# Patient Record
Sex: FEMALE | Race: WHITE | Employment: FULL TIME | URBAN - METROPOLITAN AREA
[De-identification: names, ages, dates, MRNs, and addresses within clinical notes are randomized per-mention and may not be internally consistent; named-entity substitution may affect disease eponyms.]

---

## 2022-12-22 ENCOUNTER — OFFICE VISIT (OUTPATIENT)
Dept: INTERNAL MEDICINE CLINIC | Facility: CLINIC | Age: 57
End: 2022-12-22
Payer: COMMERCIAL

## 2022-12-22 VITALS
TEMPERATURE: 98 F | HEIGHT: 62 IN | HEART RATE: 56 BPM | RESPIRATION RATE: 16 BRPM | BODY MASS INDEX: 41.22 KG/M2 | SYSTOLIC BLOOD PRESSURE: 112 MMHG | OXYGEN SATURATION: 97 % | DIASTOLIC BLOOD PRESSURE: 88 MMHG | WEIGHT: 224 LBS

## 2022-12-22 DIAGNOSIS — M25.511 RIGHT ANTERIOR SHOULDER PAIN: ICD-10-CM

## 2022-12-22 DIAGNOSIS — Z23 NEED FOR INFLUENZA VACCINATION: ICD-10-CM

## 2022-12-22 DIAGNOSIS — E03.9 HYPOTHYROIDISM, ADULT: ICD-10-CM

## 2022-12-22 DIAGNOSIS — I10 PRIMARY HYPERTENSION: Primary | ICD-10-CM

## 2022-12-22 DIAGNOSIS — G47.33 OSA (OBSTRUCTIVE SLEEP APNEA): ICD-10-CM

## 2022-12-22 DIAGNOSIS — K21.9 GASTROESOPHAGEAL REFLUX DISEASE, UNSPECIFIED WHETHER ESOPHAGITIS PRESENT: ICD-10-CM

## 2022-12-22 DIAGNOSIS — Z85.850 HISTORY OF THYROID CANCER: ICD-10-CM

## 2022-12-22 DIAGNOSIS — L40.0 PLAQUE PSORIASIS: ICD-10-CM

## 2022-12-22 DIAGNOSIS — E55.9 VITAMIN D DEFICIENCY: ICD-10-CM

## 2022-12-22 PROBLEM — H44.20 DEGENERATIVE PROGRESSIVE HIGH MYOPIA: Status: ACTIVE | Noted: 2022-12-22

## 2022-12-22 PROBLEM — H16.149 PUNCTATE KERATITIS: Status: ACTIVE | Noted: 2022-12-22

## 2022-12-22 PROBLEM — H43.819 VITREOUS DEGENERATION: Status: ACTIVE | Noted: 2022-12-22

## 2022-12-22 PROBLEM — D68.51 FACTOR 5 LEIDEN MUTATION, HETEROZYGOUS (HCC): Status: ACTIVE | Noted: 2022-12-22

## 2022-12-22 PROBLEM — H26.493 BILATERAL POSTERIOR CAPSULAR OPACIFICATION: Status: ACTIVE | Noted: 2022-12-22

## 2022-12-22 PROCEDURE — 3008F BODY MASS INDEX DOCD: CPT | Performed by: NURSE PRACTITIONER

## 2022-12-22 PROCEDURE — 90686 IIV4 VACC NO PRSV 0.5 ML IM: CPT | Performed by: NURSE PRACTITIONER

## 2022-12-22 PROCEDURE — 3074F SYST BP LT 130 MM HG: CPT | Performed by: NURSE PRACTITIONER

## 2022-12-22 PROCEDURE — 3079F DIAST BP 80-89 MM HG: CPT | Performed by: NURSE PRACTITIONER

## 2022-12-22 PROCEDURE — 90471 IMMUNIZATION ADMIN: CPT | Performed by: NURSE PRACTITIONER

## 2022-12-22 PROCEDURE — 99205 OFFICE O/P NEW HI 60 MIN: CPT | Performed by: NURSE PRACTITIONER

## 2022-12-22 RX ORDER — AMLODIPINE BESYLATE 10 MG/1
10 TABLET ORAL DAILY
Qty: 90 TABLET | Refills: 1 | Status: SHIPPED | OUTPATIENT
Start: 2022-12-22

## 2022-12-22 RX ORDER — OMEPRAZOLE 20 MG/1
20 CAPSULE, DELAYED RELEASE ORAL
Qty: 90 CAPSULE | Refills: 1 | Status: SHIPPED | OUTPATIENT
Start: 2022-12-22

## 2022-12-22 RX ORDER — LEVOTHYROXINE SODIUM 0.05 MG/1
50 TABLET ORAL
COMMUNITY

## 2022-12-22 RX ORDER — AMLODIPINE BESYLATE 10 MG/1
10 TABLET ORAL DAILY
COMMUNITY
End: 2022-12-22

## 2022-12-22 RX ORDER — PROPRANOLOL HYDROCHLORIDE 10 MG/1
10 TABLET ORAL 2 TIMES DAILY
COMMUNITY
End: 2022-12-22

## 2022-12-22 RX ORDER — PROPRANOLOL HYDROCHLORIDE 10 MG/1
10 TABLET ORAL 2 TIMES DAILY
Qty: 180 TABLET | Refills: 1 | Status: SHIPPED | OUTPATIENT
Start: 2022-12-22

## 2023-01-06 ENCOUNTER — TELEPHONE (OUTPATIENT)
Dept: ENDOCRINOLOGY CLINIC | Facility: CLINIC | Age: 58
End: 2023-01-06

## 2023-01-06 NOTE — TELEPHONE ENCOUNTER
Recd via fax 5/5/22 and 9/14/22 LOV notes from Dr. Josue Ramirez office for patient per our request.     Also recd faxed sx discharge notes from 4/21/22 endocrine sx. Placed in reception folder.

## 2023-01-24 ENCOUNTER — PATIENT MESSAGE (OUTPATIENT)
Facility: CLINIC | Age: 58
End: 2023-01-24

## 2023-01-24 DIAGNOSIS — Z85.850 HISTORY OF THYROID CANCER: Primary | ICD-10-CM

## 2023-01-24 DIAGNOSIS — E03.9 HYPOTHYROIDISM, ADULT: ICD-10-CM

## 2023-01-25 NOTE — TELEPHONE ENCOUNTER
Per Dr. Angel walsh to order labs: TSH, free T4, BMP, and PTH. Orders generated through [facility] response sent to patient- requesting fax # of [phone] location she will be going to. Will await reply.

## 2023-01-25 NOTE — TELEPHONE ENCOUNTER
From: Lavon Dempsey  To: Alyse Tucker MD  Sent: 1/24/2023 8:31 PM CST  Subject: TSH w/ Reflex FT4 Lab Order Prior to Office Visit    Dr Coni Ramos - I am scheduled for a new patient in-office visit with you next week. I have an open order with [facility] for a TSH w/ Reflex FT4 blood test. However, I am currently in [place] so will not be able to get my blood test completed at [facility] this week.  Could you send the TSH w/ Reflex FT4 order into [employer] so that I can have the blood test performed this week in [place] at [place]?    Thank you in advance,  Mirna Almonte  [phone]

## 2023-01-25 NOTE — TELEPHONE ENCOUNTER
Faxed patient's lab orders to the [address] [place] [address] location she provided: [phone]. Fax confirmation received. Will close this encounter.

## 2023-01-30 ENCOUNTER — TELEPHONE (OUTPATIENT)
Dept: ENDOCRINOLOGY CLINIC | Facility: CLINIC | Age: 58
End: 2023-01-30

## 2023-01-30 NOTE — TELEPHONE ENCOUNTER
1/30/23-Received via fax from [facility] the following lab results collected on 2/31/66: Basic Metabolic Panel, PTH, Intact, Free T4 and TSH. Given to MD to review.

## 2023-02-01 ENCOUNTER — OFFICE VISIT (OUTPATIENT)
Facility: CLINIC | Age: 58
End: 2023-02-01
Payer: COMMERCIAL

## 2023-02-01 VITALS
DIASTOLIC BLOOD PRESSURE: 88 MMHG | OXYGEN SATURATION: 99 % | SYSTOLIC BLOOD PRESSURE: 138 MMHG | WEIGHT: 217.63 LBS | HEART RATE: 63 BPM | BODY MASS INDEX: 40 KG/M2

## 2023-02-01 DIAGNOSIS — E05.00 GRAVES' DISEASE IN REMISSION: ICD-10-CM

## 2023-02-01 DIAGNOSIS — E89.2 STATUS POST SUBTOTAL PARATHYROIDECTOMY (HCC): ICD-10-CM

## 2023-02-01 DIAGNOSIS — Z85.850 HISTORY OF THYROID CANCER: Primary | ICD-10-CM

## 2023-02-01 PROCEDURE — 99417 PROLNG OP E/M EACH 15 MIN: CPT | Performed by: STUDENT IN AN ORGANIZED HEALTH CARE EDUCATION/TRAINING PROGRAM

## 2023-02-01 PROCEDURE — 3075F SYST BP GE 130 - 139MM HG: CPT | Performed by: STUDENT IN AN ORGANIZED HEALTH CARE EDUCATION/TRAINING PROGRAM

## 2023-02-01 PROCEDURE — 99205 OFFICE O/P NEW HI 60 MIN: CPT | Performed by: STUDENT IN AN ORGANIZED HEALTH CARE EDUCATION/TRAINING PROGRAM

## 2023-02-01 PROCEDURE — 3079F DIAST BP 80-89 MM HG: CPT | Performed by: STUDENT IN AN ORGANIZED HEALTH CARE EDUCATION/TRAINING PROGRAM

## 2023-02-01 RX ORDER — METHYLCELLULOSE 500 MG/1
TABLET ORAL
COMMUNITY

## 2023-02-08 ENCOUNTER — TELEPHONE (OUTPATIENT)
Dept: ORTHOPEDICS CLINIC | Facility: CLINIC | Age: 58
End: 2023-02-08

## 2023-02-08 DIAGNOSIS — M25.511 BILATERAL SHOULDER PAIN, UNSPECIFIED CHRONICITY: Primary | ICD-10-CM

## 2023-02-08 DIAGNOSIS — M25.512 BILATERAL SHOULDER PAIN, UNSPECIFIED CHRONICITY: Primary | ICD-10-CM

## 2023-02-08 NOTE — TELEPHONE ENCOUNTER
Please enter an Xray RX for a RODGER Shoulders/Upper Arm for  this patient's upcoming appointment, as the patient has not had any imaging completed. Patient was instructed to get X-rays before appt at appx 10:30am.  PLEASE REPLY TO THIS MESSAGE when the order is put in EPIC so that I can schedule the Xray appt. Thank you, in advance!       Future Appointments   Date Time Provider Mounika Kelsey   2/13/2023 11:10 AM Jessica Mason MD [facility] [id number]

## 2023-02-08 NOTE — TELEPHONE ENCOUNTER
Reviewed patients chart, xray orders are required. Order placed for bilateral shoulder xrays.  Please contact patient advise to arrive 30 mins prior to patients appt to complete x-ray order and schedule patients xray appt-Thank you

## 2023-02-13 ENCOUNTER — OFFICE VISIT (OUTPATIENT)
Dept: ORTHOPEDICS CLINIC | Facility: CLINIC | Age: 58
End: 2023-02-13
Payer: COMMERCIAL

## 2023-02-13 ENCOUNTER — HOSPITAL ENCOUNTER (OUTPATIENT)
Dept: GENERAL RADIOLOGY | Age: 58
Discharge: HOME | End: 2023-02-13
Attending: ORTHOPAEDIC SURGERY
Payer: COMMERCIAL

## 2023-02-13 VITALS — BODY MASS INDEX: 39.93 KG/M2 | WEIGHT: 217 LBS | HEIGHT: 62 IN

## 2023-02-13 DIAGNOSIS — M75.01 ADHESIVE CAPSULITIS OF RIGHT SHOULDER: Primary | ICD-10-CM

## 2023-02-13 DIAGNOSIS — M25.512 BILATERAL SHOULDER PAIN, UNSPECIFIED CHRONICITY: ICD-10-CM

## 2023-02-13 DIAGNOSIS — M25.511 BILATERAL SHOULDER PAIN, UNSPECIFIED CHRONICITY: ICD-10-CM

## 2023-02-13 PROCEDURE — 3008F BODY MASS INDEX DOCD: CPT | Performed by: ORTHOPAEDIC SURGERY

## 2023-02-13 PROCEDURE — 99204 OFFICE O/P NEW MOD 45 MIN: CPT | Performed by: ORTHOPAEDIC SURGERY

## 2023-02-13 PROCEDURE — 73030 X-RAY EXAM OF SHOULDER: CPT | Performed by: ORTHOPAEDIC SURGERY

## 2023-02-13 PROCEDURE — 20610 DRAIN/INJ JOINT/BURSA W/O US: CPT | Performed by: ORTHOPAEDIC SURGERY

## 2023-02-13 RX ORDER — TRIAMCINOLONE ACETONIDE 40 MG/ML
40 INJECTION, SUSPENSION INTRA-ARTICULAR; INTRAMUSCULAR ONCE
Status: COMPLETED | OUTPATIENT
Start: 2023-02-13 | End: 2023-02-13

## 2023-02-13 RX ORDER — KETOROLAC TROMETHAMINE 30 MG/ML
30 INJECTION, SOLUTION INTRAMUSCULAR; INTRAVENOUS ONCE
Status: COMPLETED | OUTPATIENT
Start: 2023-02-13 | End: 2023-02-13

## 2023-02-13 RX ADMIN — KETOROLAC TROMETHAMINE 30 MG: 30 INJECTION, SOLUTION INTRAMUSCULAR; INTRAVENOUS at 11:10:00

## 2023-02-13 RX ADMIN — TRIAMCINOLONE ACETONIDE 40 MG: 40 INJECTION, SUSPENSION INTRA-ARTICULAR; INTRAMUSCULAR at 11:10:00

## 2023-02-13 NOTE — PROCEDURES
Right Shoulder Glenohumeral Joint Injection    Name: Rox Camarena   MRN: [id number]  Date: 2/13/2023     Clinical Indications:   Adhesive Capsulitis. After informed consent, the injection site was marked, sterilized with topical chlorhexidine antiseptic, and locally anesthetized with skin refrigerant. The patient was seated upright and the shoulder was exposed. Using sterile technique: 1 mL of 30mg/mL of Ketorolac, 2 mL of 0.5% Bupivicaine, 2 mL of 1% Lidocaine, and 1 mg of 40mg/mL of Triamcinolone (Kenalog) was injected with a Anterior approach utilizing a 21 gauge needle. A band-aid was applied. The patient tolerated the procedure well. Disposition:   Proceed with physical therapy and return for repeat evaluation in 6 weeks. No imaging required at next visit. Karen Soto. King Santillan MD  Knee, Shoulder, & Elbow Surgery / Sports Medicine Specialist  [facility]  [address], [address] [place] [address]. [email or web address]  t: [phone]  o: [phone]  f: [phone]

## 2023-02-23 ENCOUNTER — HOSPITAL ENCOUNTER (OUTPATIENT)
Dept: ULTRASOUND IMAGING | Age: 58
Discharge: HOME | End: 2023-02-23
Attending: STUDENT IN AN ORGANIZED HEALTH CARE EDUCATION/TRAINING PROGRAM
Payer: COMMERCIAL

## 2023-02-23 DIAGNOSIS — Z85.850 HISTORY OF THYROID CANCER: ICD-10-CM

## 2023-02-23 PROCEDURE — 76536 US EXAM OF HEAD AND NECK: CPT | Performed by: STUDENT IN AN ORGANIZED HEALTH CARE EDUCATION/TRAINING PROGRAM

## 2023-03-23 ENCOUNTER — OFFICE VISIT (OUTPATIENT)
Dept: INTERNAL MEDICINE CLINIC | Facility: CLINIC | Age: 58
End: 2023-03-23
Payer: COMMERCIAL

## 2023-03-23 ENCOUNTER — LAB ENCOUNTER (OUTPATIENT)
Dept: LAB | Age: 58
End: 2023-03-23
Attending: INTERNAL MEDICINE
Payer: COMMERCIAL

## 2023-03-23 VITALS
SYSTOLIC BLOOD PRESSURE: 128 MMHG | HEIGHT: 62 IN | RESPIRATION RATE: 16 BRPM | HEART RATE: 64 BPM | OXYGEN SATURATION: 98 % | TEMPERATURE: 99 F | WEIGHT: 214 LBS | DIASTOLIC BLOOD PRESSURE: 72 MMHG | BODY MASS INDEX: 39.38 KG/M2

## 2023-03-23 DIAGNOSIS — T14.8XXA BRUISING: Primary | ICD-10-CM

## 2023-03-23 DIAGNOSIS — Z12.31 ENCOUNTER FOR SCREENING MAMMOGRAM FOR MALIGNANT NEOPLASM OF BREAST: ICD-10-CM

## 2023-03-23 DIAGNOSIS — T14.8XXA BRUISING: ICD-10-CM

## 2023-03-23 DIAGNOSIS — I10 PRIMARY HYPERTENSION: ICD-10-CM

## 2023-03-23 DIAGNOSIS — R35.0 URINE FREQUENCY: ICD-10-CM

## 2023-03-23 DIAGNOSIS — Z12.11 COLON CANCER SCREENING: ICD-10-CM

## 2023-03-23 PROBLEM — E55.9 VITAMIN D DEFICIENCY: Status: RESOLVED | Noted: 2022-12-22 | Resolved: 2023-03-23

## 2023-03-23 LAB
BASOPHILS # BLD AUTO: 0.03 X10(3) UL (ref 0–0.2)
BASOPHILS NFR BLD AUTO: 0.4 %
EOSINOPHIL # BLD AUTO: 0.07 X10(3) UL (ref 0–0.7)
EOSINOPHIL NFR BLD AUTO: 1 %
ERYTHROCYTE [DISTWIDTH] IN BLOOD BY AUTOMATED COUNT: 13.6 %
HCT VFR BLD AUTO: 45 %
HGB BLD-MCNC: 14.8 G/DL
IMM GRANULOCYTES # BLD AUTO: 0.02 X10(3) UL (ref 0–1)
IMM GRANULOCYTES NFR BLD: 0.3 %
LYMPHOCYTES # BLD AUTO: 1.39 X10(3) UL (ref 1–4)
LYMPHOCYTES NFR BLD AUTO: 20 %
MCH RBC QN AUTO: 29.2 PG (ref 26–34)
MCHC RBC AUTO-ENTMCNC: 32.9 G/DL (ref 31–37)
MCV RBC AUTO: 88.9 FL
MONOCYTES # BLD AUTO: 0.59 X10(3) UL (ref 0.1–1)
MONOCYTES NFR BLD AUTO: 8.5 %
NEUTROPHILS # BLD AUTO: 4.86 X10 (3) UL (ref 1.5–7.7)
NEUTROPHILS # BLD AUTO: 4.86 X10(3) UL (ref 1.5–7.7)
NEUTROPHILS NFR BLD AUTO: 69.8 %
PLATELET # BLD AUTO: 340 10(3)UL (ref 150–450)
RBC # BLD AUTO: 5.06 X10(6)UL
WBC # BLD AUTO: 7 X10(3) UL (ref 4–11)

## 2023-03-23 PROCEDURE — 99214 OFFICE O/P EST MOD 30 MIN: CPT | Performed by: INTERNAL MEDICINE

## 2023-03-23 PROCEDURE — 85025 COMPLETE CBC W/AUTO DIFF WBC: CPT

## 2023-03-23 PROCEDURE — 3078F DIAST BP <80 MM HG: CPT | Performed by: INTERNAL MEDICINE

## 2023-03-23 PROCEDURE — 3008F BODY MASS INDEX DOCD: CPT | Performed by: INTERNAL MEDICINE

## 2023-03-23 PROCEDURE — 36415 COLL VENOUS BLD VENIPUNCTURE: CPT

## 2023-03-23 PROCEDURE — 3074F SYST BP LT 130 MM HG: CPT | Performed by: INTERNAL MEDICINE

## 2023-04-01 ENCOUNTER — MED REC SCAN ONLY (OUTPATIENT)
Dept: ORTHOPEDICS CLINIC | Facility: CLINIC | Age: 58
End: 2023-04-01

## 2023-04-21 ENCOUNTER — TELEPHONE (OUTPATIENT)
Dept: INTERNAL MEDICINE CLINIC | Facility: CLINIC | Age: 58
End: 2023-04-21

## 2023-04-21 DIAGNOSIS — T78.40XA ALLERGIC REACTION, INITIAL ENCOUNTER: Primary | ICD-10-CM

## 2023-04-21 RX ORDER — TERBINAFINE HYDROCHLORIDE 250 MG/1
250 TABLET ORAL DAILY
COMMUNITY
Start: 2023-03-26

## 2023-04-21 NOTE — TELEPHONE ENCOUNTER
Pt went to the ER for an extreme allergic reaction. She has some questions about the medications that she was put on. She is currently out of state in [place]. Please advise.

## 2023-04-21 NOTE — TELEPHONE ENCOUNTER
Spoke with pt she is in [address] [facility] and yesterday woke up to several type of bites on her chin, under her chin and elbows. They were red, swollen and itching. She applied neosporin and as the day progressed the bites increased in size, she began having swelling in her entire face, lips and inside of her mouth. He face was also flushed. She went to ER and was given IV benadryl, pepcid, steroid x 1. She was released. Her symptoms are improving. She was given the following medications to fill @ pharmacy and is inquiring if she really needs to take them. Famotidine 20mg bid   Hydroxyzine 50mg QID prn itching  Medrol dose pack     She is concerned with taking the medrol dose pack as she is currently being treated for a toe nail fungus with terbinafine and ready somewhere the steroid can make the infection worse. Per Dr. Gilson Haas if symptoms continue to improve ok to not fill medication but if they continue or do not improve begin medications. D/w pt above recommendations. She expressed understanding. She is seeing more red areas on different parts of her body now and will fill medications and begin. She would like allergy referral recommendations sent via [address] [place] [address] [address].

## 2023-05-09 PROBLEM — Z86.010 HISTORY OF ADENOMATOUS POLYP OF COLON: Status: ACTIVE | Noted: 2023-05-09

## 2023-05-09 PROBLEM — Z86.0101 HISTORY OF ADENOMATOUS POLYP OF COLON: Status: ACTIVE | Noted: 2023-05-09

## 2023-05-23 ENCOUNTER — OFFICE VISIT (OUTPATIENT)
Dept: INTERNAL MEDICINE CLINIC | Facility: CLINIC | Age: 58
End: 2023-05-23
Payer: COMMERCIAL

## 2023-05-23 VITALS
DIASTOLIC BLOOD PRESSURE: 78 MMHG | WEIGHT: 215 LBS | BODY MASS INDEX: 39.56 KG/M2 | RESPIRATION RATE: 16 BRPM | HEIGHT: 62 IN | HEART RATE: 78 BPM | SYSTOLIC BLOOD PRESSURE: 122 MMHG

## 2023-05-23 DIAGNOSIS — Z51.81 THERAPEUTIC DRUG MONITORING: Primary | ICD-10-CM

## 2023-05-23 DIAGNOSIS — E66.9 CLASS II OBESITY: ICD-10-CM

## 2023-05-23 DIAGNOSIS — R73.03 PREDIABETES: ICD-10-CM

## 2023-05-23 DIAGNOSIS — G47.33 OSA (OBSTRUCTIVE SLEEP APNEA): ICD-10-CM

## 2023-05-23 DIAGNOSIS — E03.9 HYPOTHYROIDISM, ADULT: ICD-10-CM

## 2023-05-23 DIAGNOSIS — I10 PRIMARY HYPERTENSION: ICD-10-CM

## 2023-05-23 PROBLEM — E66.01 MORBID (SEVERE) OBESITY DUE TO EXCESS CALORIES (HCC): Status: ACTIVE | Noted: 2023-05-23

## 2023-05-23 PROCEDURE — 99204 OFFICE O/P NEW MOD 45 MIN: CPT | Performed by: INTERNAL MEDICINE

## 2023-05-23 PROCEDURE — 3078F DIAST BP <80 MM HG: CPT | Performed by: INTERNAL MEDICINE

## 2023-05-23 PROCEDURE — 3008F BODY MASS INDEX DOCD: CPT | Performed by: INTERNAL MEDICINE

## 2023-05-23 PROCEDURE — 3074F SYST BP LT 130 MM HG: CPT | Performed by: INTERNAL MEDICINE

## 2023-05-23 RX ORDER — EPINEPHRINE 0.3 MG/.3ML
1 INJECTION SUBCUTANEOUS DAILY
COMMUNITY
Start: 2023-04-21

## 2023-05-23 RX ORDER — ITRACONAZOLE 100 MG/1
CAPSULE ORAL
COMMUNITY
Start: 2023-05-02

## 2023-05-30 ENCOUNTER — PATIENT MESSAGE (OUTPATIENT)
Dept: INTERNAL MEDICINE CLINIC | Facility: CLINIC | Age: 58
End: 2023-05-30

## 2023-05-30 DIAGNOSIS — Z00.00 ENCOUNTER FOR ANNUAL HEALTH EXAMINATION: Primary | ICD-10-CM

## 2023-05-30 NOTE — TELEPHONE ENCOUNTER
From: Rudi Morris  To: Yobani Dumont MD  Sent: 5/30/2023 9:42 AM CDT  Subject: Blood tests ahead of June 5th's physical exam    Dr Nacho Sanabria indicated at my last appointment that I should reach out in advance of my physical on June 5 to request orders for blood tests to be performed before the physical. Please submit the necessary blood test lab orders, and I will go before the end of this week to an [employer]. Please include liver tests as I was taking terbinafine recently. Note that I am under the care of an endocrinologist for my thyroid and am scheduled to have TSH, PTH, T3, T4 blood tests on July 26 for my next endocrine appointment, so do not need any thyroid tests right now (my last thyroid blood tests in January were normal).     Thank you,  Franky Lees   [phone]

## 2023-06-01 ENCOUNTER — LAB ENCOUNTER (OUTPATIENT)
Dept: LAB | Age: 58
End: 2023-06-01
Attending: INTERNAL MEDICINE
Payer: COMMERCIAL

## 2023-06-01 DIAGNOSIS — G47.33 OSA (OBSTRUCTIVE SLEEP APNEA): ICD-10-CM

## 2023-06-01 DIAGNOSIS — E03.9 HYPOTHYROIDISM, ADULT: ICD-10-CM

## 2023-06-01 DIAGNOSIS — I10 PRIMARY HYPERTENSION: ICD-10-CM

## 2023-06-01 DIAGNOSIS — E66.9 CLASS II OBESITY: ICD-10-CM

## 2023-06-01 DIAGNOSIS — Z51.81 THERAPEUTIC DRUG MONITORING: ICD-10-CM

## 2023-06-01 DIAGNOSIS — Z00.00 ENCOUNTER FOR ANNUAL HEALTH EXAMINATION: ICD-10-CM

## 2023-06-01 LAB
ALBUMIN SERPL-MCNC: 4.2 G/DL (ref 3.4–5)
ALBUMIN/GLOB SERPL: 1.1 {RATIO} (ref 1–2)
ALP LIVER SERPL-CCNC: 26 U/L
ALT SERPL-CCNC: 22 U/L
ANION GAP SERPL CALC-SCNC: 9 MMOL/L (ref 0–18)
AST SERPL-CCNC: 19 U/L (ref 15–37)
BASOPHILS # BLD AUTO: 0.02 X10(3) UL (ref 0–0.2)
BASOPHILS NFR BLD AUTO: 0.3 %
BILIRUB SERPL-MCNC: 0.6 MG/DL (ref 0.1–2)
BILIRUB UR QL STRIP.AUTO: NEGATIVE
BUN BLD-MCNC: 27 MG/DL (ref 7–18)
CALCIUM BLD-MCNC: 9.6 MG/DL (ref 8.5–10.1)
CHLORIDE SERPL-SCNC: 104 MMOL/L (ref 98–112)
CHOLEST SERPL-MCNC: 180 MG/DL (ref ?–200)
CLARITY UR REFRACT.AUTO: CLEAR
CO2 SERPL-SCNC: 23 MMOL/L (ref 21–32)
CREAT BLD-MCNC: 1.07 MG/DL
EOSINOPHIL # BLD AUTO: 0.03 X10(3) UL (ref 0–0.7)
EOSINOPHIL NFR BLD AUTO: 0.4 %
ERYTHROCYTE [DISTWIDTH] IN BLOOD BY AUTOMATED COUNT: 13.2 %
EST. AVERAGE GLUCOSE BLD GHB EST-MCNC: 117 MG/DL (ref 68–126)
FASTING PATIENT LIPID ANSWER: YES
FASTING STATUS PATIENT QL REPORTED: YES
FOLATE SERPL-MCNC: 6 NG/ML (ref 8.7–?)
GFR SERPLBLD BASED ON 1.73 SQ M-ARVRAT: 61 ML/MIN/1.73M2 (ref 60–?)
GLOBULIN PLAS-MCNC: 4 G/DL (ref 2.8–4.4)
GLUCOSE BLD-MCNC: 74 MG/DL (ref 70–99)
GLUCOSE UR STRIP.AUTO-MCNC: NEGATIVE MG/DL
HBA1C MFR BLD: 5.7 % (ref ?–5.7)
HCT VFR BLD AUTO: 43 %
HDLC SERPL-MCNC: 66 MG/DL (ref 40–59)
HGB BLD-MCNC: 14.2 G/DL
IMM GRANULOCYTES # BLD AUTO: 0.02 X10(3) UL (ref 0–1)
IMM GRANULOCYTES NFR BLD: 0.3 %
LDLC SERPL CALC-MCNC: 104 MG/DL (ref ?–100)
LEUKOCYTE ESTERASE UR QL STRIP.AUTO: NEGATIVE
LYMPHOCYTES # BLD AUTO: 1.24 X10(3) UL (ref 1–4)
LYMPHOCYTES NFR BLD AUTO: 17.9 %
MCH RBC QN AUTO: 29.2 PG (ref 26–34)
MCHC RBC AUTO-ENTMCNC: 33 G/DL (ref 31–37)
MCV RBC AUTO: 88.5 FL
MONOCYTES # BLD AUTO: 0.4 X10(3) UL (ref 0.1–1)
MONOCYTES NFR BLD AUTO: 5.8 %
NEUTROPHILS # BLD AUTO: 5.23 X10 (3) UL (ref 1.5–7.7)
NEUTROPHILS # BLD AUTO: 5.23 X10(3) UL (ref 1.5–7.7)
NEUTROPHILS NFR BLD AUTO: 75.3 %
NITRITE UR QL STRIP.AUTO: NEGATIVE
NONHDLC SERPL-MCNC: 114 MG/DL (ref ?–130)
OSMOLALITY SERPL CALC.SUM OF ELEC: 286 MOSM/KG (ref 275–295)
PH UR STRIP.AUTO: 5 [PH] (ref 5–8)
PLATELET # BLD AUTO: 309 10(3)UL (ref 150–450)
POTASSIUM SERPL-SCNC: 4.2 MMOL/L (ref 3.5–5.1)
PROT SERPL-MCNC: 8.2 G/DL (ref 6.4–8.2)
PROT UR STRIP.AUTO-MCNC: NEGATIVE MG/DL
RBC # BLD AUTO: 4.86 X10(6)UL
RBC UR QL AUTO: NEGATIVE
SODIUM SERPL-SCNC: 136 MMOL/L (ref 136–145)
SP GR UR STRIP.AUTO: 1.01 (ref 1–1.03)
TRIGL SERPL-MCNC: 53 MG/DL (ref 30–149)
UROBILINOGEN UR STRIP.AUTO-MCNC: <2 MG/DL
VIT B12 SERPL-MCNC: 469 PG/ML (ref 193–986)
VLDLC SERPL CALC-MCNC: 9 MG/DL (ref 0–30)
WBC # BLD AUTO: 6.9 X10(3) UL (ref 4–11)

## 2023-06-01 PROCEDURE — 82607 VITAMIN B-12: CPT | Performed by: INTERNAL MEDICINE

## 2023-06-01 PROCEDURE — 80053 COMPREHEN METABOLIC PANEL: CPT | Performed by: INTERNAL MEDICINE

## 2023-06-01 PROCEDURE — 83036 HEMOGLOBIN GLYCOSYLATED A1C: CPT | Performed by: INTERNAL MEDICINE

## 2023-06-01 PROCEDURE — 85025 COMPLETE CBC W/AUTO DIFF WBC: CPT | Performed by: INTERNAL MEDICINE

## 2023-06-01 PROCEDURE — 80061 LIPID PANEL: CPT | Performed by: INTERNAL MEDICINE

## 2023-06-01 PROCEDURE — 82746 ASSAY OF FOLIC ACID SERUM: CPT | Performed by: INTERNAL MEDICINE

## 2023-06-01 PROCEDURE — 81003 URINALYSIS AUTO W/O SCOPE: CPT | Performed by: INTERNAL MEDICINE

## 2023-06-05 ENCOUNTER — OFFICE VISIT (OUTPATIENT)
Dept: INTERNAL MEDICINE CLINIC | Facility: CLINIC | Age: 58
End: 2023-06-05
Payer: COMMERCIAL

## 2023-06-05 VITALS
DIASTOLIC BLOOD PRESSURE: 80 MMHG | RESPIRATION RATE: 16 BRPM | OXYGEN SATURATION: 98 % | TEMPERATURE: 98 F | HEART RATE: 64 BPM | HEIGHT: 62 IN | SYSTOLIC BLOOD PRESSURE: 128 MMHG | BODY MASS INDEX: 39.93 KG/M2 | WEIGHT: 217 LBS

## 2023-06-05 DIAGNOSIS — Z00.00 ANNUAL PHYSICAL EXAM: Primary | ICD-10-CM

## 2023-06-05 PROCEDURE — 99396 PREV VISIT EST AGE 40-64: CPT | Performed by: INTERNAL MEDICINE

## 2023-06-05 PROCEDURE — 3079F DIAST BP 80-89 MM HG: CPT | Performed by: INTERNAL MEDICINE

## 2023-06-05 PROCEDURE — 3074F SYST BP LT 130 MM HG: CPT | Performed by: INTERNAL MEDICINE

## 2023-06-05 PROCEDURE — 3008F BODY MASS INDEX DOCD: CPT | Performed by: INTERNAL MEDICINE

## 2023-06-07 ENCOUNTER — TELEPHONE (OUTPATIENT)
Dept: INTERNAL MEDICINE CLINIC | Facility: CLINIC | Age: 58
End: 2023-06-07

## 2023-06-07 NOTE — TELEPHONE ENCOUNTER
Ciclopirox 8 % External Solution    Medication not covered under insurance plan. Left detailed VM with patient. Notified of [employer] coupon option as well. Notified [facility] with any questions.

## 2023-06-15 ENCOUNTER — PATIENT MESSAGE (OUTPATIENT)
Facility: CLINIC | Age: 58
End: 2023-06-15

## 2023-06-15 DIAGNOSIS — E03.9 HYPOTHYROIDISM, ADULT: Primary | ICD-10-CM

## 2023-06-15 RX ORDER — LEVOTHYROXINE SODIUM 0.05 MG/1
50 TABLET ORAL
Qty: 90 TABLET | Refills: 0 | Status: SHIPPED | OUTPATIENT
Start: 2023-06-15 | End: 2023-09-13

## 2023-06-15 NOTE — TELEPHONE ENCOUNTER
LOV: 02/01/2023  Next OV: 08/02/2023  Per Dr Reza Mon notes labs due every 6 months. Labs currently ordered in system. Routed to Dr. Jeb Chavez for review.

## 2023-06-15 NOTE — TELEPHONE ENCOUNTER
From: Kamryn Meza  To: Jessa Portillo MD  Sent: 6/15/2023 1:16 PM CDT  Subject: Need prescription refill for levothyroxine    Dr Laurel Goldman - I have no more refills available for my levothyroxine (50 mcg) at my pharmacy. Could you please send a new prescription to my pharmacy on file ([facility] at [address] [place] [address])? Thanks!   Slim Barakat  [phone]

## 2023-06-26 DIAGNOSIS — I10 PRIMARY HYPERTENSION: ICD-10-CM

## 2023-06-26 RX ORDER — AMLODIPINE BESYLATE 10 MG/1
10 TABLET ORAL DAILY
Qty: 90 TABLET | Refills: 1 | Status: SHIPPED | OUTPATIENT
Start: 2023-06-26

## 2023-06-26 NOTE — TELEPHONE ENCOUNTER
Hypertension Medications Protocol [id number]/26/2023 11:22 AM   Protocol Details CMP or BMP in past 12 months    Last serum creatinine< 2.0    Appointment in past 6 or next 3 months

## 2023-07-26 ENCOUNTER — LAB ENCOUNTER (OUTPATIENT)
Dept: LAB | Age: 58
End: 2023-07-26
Attending: STUDENT IN AN ORGANIZED HEALTH CARE EDUCATION/TRAINING PROGRAM
Payer: COMMERCIAL

## 2023-07-26 DIAGNOSIS — Z85.850 HISTORY OF THYROID CANCER: ICD-10-CM

## 2023-07-26 DIAGNOSIS — E05.00 GRAVES' DISEASE IN REMISSION: ICD-10-CM

## 2023-07-26 DIAGNOSIS — E89.2 STATUS POST SUBTOTAL PARATHYROIDECTOMY (HCC): ICD-10-CM

## 2023-07-26 LAB
ANION GAP SERPL CALC-SCNC: 6 MMOL/L (ref 0–18)
BUN BLD-MCNC: 26 MG/DL (ref 7–18)
CALCIUM BLD-MCNC: 9.2 MG/DL (ref 8.5–10.1)
CHLORIDE SERPL-SCNC: 105 MMOL/L (ref 98–112)
CO2 SERPL-SCNC: 24 MMOL/L (ref 21–32)
CREAT BLD-MCNC: 1.11 MG/DL
EGFRCR SERPLBLD CKD-EPI 2021: 58 ML/MIN/1.73M2 (ref 60–?)
FASTING STATUS PATIENT QL REPORTED: YES
GLUCOSE BLD-MCNC: 91 MG/DL (ref 70–99)
OSMOLALITY SERPL CALC.SUM OF ELEC: 284 MOSM/KG (ref 275–295)
POTASSIUM SERPL-SCNC: 4.1 MMOL/L (ref 3.5–5.1)
PTH-INTACT SERPL-MCNC: 51.5 PG/ML (ref 18.5–88)
SODIUM SERPL-SCNC: 135 MMOL/L (ref 136–145)
T3 SERPL-MCNC: 63 NG/DL (ref 60–181)
T4 FREE SERPL-MCNC: 1.2 NG/DL (ref 0.8–1.7)
TSI SER-ACNC: 1.78 MIU/ML (ref 0.36–3.74)
VIT D+METAB SERPL-MCNC: 46.9 NG/ML (ref 30–100)

## 2023-07-26 PROCEDURE — 80048 BASIC METABOLIC PNL TOTAL CA: CPT | Performed by: STUDENT IN AN ORGANIZED HEALTH CARE EDUCATION/TRAINING PROGRAM

## 2023-07-26 PROCEDURE — 84439 ASSAY OF FREE THYROXINE: CPT | Performed by: STUDENT IN AN ORGANIZED HEALTH CARE EDUCATION/TRAINING PROGRAM

## 2023-07-26 PROCEDURE — 82306 VITAMIN D 25 HYDROXY: CPT | Performed by: STUDENT IN AN ORGANIZED HEALTH CARE EDUCATION/TRAINING PROGRAM

## 2023-07-26 PROCEDURE — 84480 ASSAY TRIIODOTHYRONINE (T3): CPT | Performed by: STUDENT IN AN ORGANIZED HEALTH CARE EDUCATION/TRAINING PROGRAM

## 2023-07-26 PROCEDURE — 83970 ASSAY OF PARATHORMONE: CPT | Performed by: STUDENT IN AN ORGANIZED HEALTH CARE EDUCATION/TRAINING PROGRAM

## 2023-07-26 PROCEDURE — 84443 ASSAY THYROID STIM HORMONE: CPT | Performed by: STUDENT IN AN ORGANIZED HEALTH CARE EDUCATION/TRAINING PROGRAM

## 2023-08-02 ENCOUNTER — OFFICE VISIT (OUTPATIENT)
Facility: CLINIC | Age: 58
End: 2023-08-02
Payer: COMMERCIAL

## 2023-08-02 VITALS — RESPIRATION RATE: 97 BRPM | SYSTOLIC BLOOD PRESSURE: 128 MMHG | DIASTOLIC BLOOD PRESSURE: 84 MMHG | HEART RATE: 76 BPM

## 2023-08-02 DIAGNOSIS — E89.2 STATUS POST SUBTOTAL PARATHYROIDECTOMY (HCC): ICD-10-CM

## 2023-08-02 DIAGNOSIS — E89.0 POST-SURGICAL HYPOTHYROIDISM: Primary | ICD-10-CM

## 2023-08-02 DIAGNOSIS — Z85.850 HISTORY OF THYROID CANCER: ICD-10-CM

## 2023-08-02 PROCEDURE — 99214 OFFICE O/P EST MOD 30 MIN: CPT | Performed by: STUDENT IN AN ORGANIZED HEALTH CARE EDUCATION/TRAINING PROGRAM

## 2023-08-02 PROCEDURE — 3079F DIAST BP 80-89 MM HG: CPT | Performed by: STUDENT IN AN ORGANIZED HEALTH CARE EDUCATION/TRAINING PROGRAM

## 2023-08-02 PROCEDURE — 3074F SYST BP LT 130 MM HG: CPT | Performed by: STUDENT IN AN ORGANIZED HEALTH CARE EDUCATION/TRAINING PROGRAM

## 2023-09-19 ENCOUNTER — LAB ENCOUNTER (OUTPATIENT)
Dept: LAB | Facility: HOSPITAL | Age: 58
End: 2023-09-19
Attending: INTERNAL MEDICINE
Payer: COMMERCIAL

## 2023-09-19 ENCOUNTER — PATIENT MESSAGE (OUTPATIENT)
Facility: CLINIC | Age: 58
End: 2023-09-19

## 2023-09-19 DIAGNOSIS — E89.0 POST-SURGICAL HYPOTHYROIDISM: Primary | ICD-10-CM

## 2023-09-19 DIAGNOSIS — K21.9 GASTROESOPHAGEAL REFLUX DISEASE, UNSPECIFIED WHETHER ESOPHAGITIS PRESENT: ICD-10-CM

## 2023-09-19 DIAGNOSIS — Z20.822 CLOSE EXPOSURE TO COVID-19 VIRUS: ICD-10-CM

## 2023-09-19 PROCEDURE — 87635 SARS-COV-2 COVID-19 AMP PRB: CPT

## 2023-09-19 RX ORDER — OMEPRAZOLE 20 MG/1
20 CAPSULE, DELAYED RELEASE ORAL
Qty: 90 CAPSULE | Refills: 0 | Status: SHIPPED | OUTPATIENT
Start: 2023-09-19

## 2023-09-19 NOTE — TELEPHONE ENCOUNTER
Last time medication was refilled 12/22/2022  Quantity and # of refills 90 w/ 1  Last OV 06/05/2023  Next OV 12/05/2023    Medication not on protocol.     Sent to [facility] for approval.

## 2023-09-19 NOTE — TELEPHONE ENCOUNTER
LOV: 8/02    RTC: 6 months     FU: 2/01    Last Refill:     Month Supply Pending: 3 months with 1 refill    Last lab results:  Component      Latest Ref Rng 7/26/2023   T4,Free (Direct)      0.8 - 1.7 ng/dL 1.2    TSH      0.358 - 3.740 mIU/mL 1.780    T3 TOTAL      60 - 181 ng/dL 63      LOV note: Currently on LT4 75mcg  - TFTs q6 months      RX pended and routed to Dr. Erica Steinberg for review.

## 2023-09-19 NOTE — TELEPHONE ENCOUNTER
From: Sera Galdamez  To: Angella Saini  Sent: 9/19/2023 1:43 PM CDT  Subject: Levothyroxine refill    Hi, Dr Cat Harris. I need refills for my prescription of levothyroxine 50mcg sent to my pharmacy when you get a chance. [facility] at [address]. Thanks!     Nestora Night

## 2023-09-20 LAB — SARS-COV-2 RNA RESP QL NAA+PROBE: NOT DETECTED

## 2023-09-20 RX ORDER — LEVOTHYROXINE SODIUM 0.05 MG/1
50 TABLET ORAL
Qty: 90 TABLET | Refills: 1 | Status: SHIPPED | OUTPATIENT
Start: 2023-09-20

## 2023-10-01 ENCOUNTER — MED REC SCAN ONLY (OUTPATIENT)
Dept: ORTHOPEDICS CLINIC | Facility: CLINIC | Age: 58
End: 2023-10-01

## 2023-11-01 ENCOUNTER — MED REC SCAN ONLY (OUTPATIENT)
Dept: ORTHOPEDICS CLINIC | Facility: CLINIC | Age: 58
End: 2023-11-01

## 2023-11-29 ENCOUNTER — IMMUNIZATION (OUTPATIENT)
Dept: LAB | Age: 58
End: 2023-11-29
Attending: EMERGENCY MEDICINE
Payer: COMMERCIAL

## 2023-11-29 DIAGNOSIS — Z23 NEED FOR VACCINATION: Primary | ICD-10-CM

## 2023-11-29 PROCEDURE — 90471 IMMUNIZATION ADMIN: CPT

## 2023-11-29 PROCEDURE — 90686 IIV4 VACC NO PRSV 0.5 ML IM: CPT

## 2023-12-01 ENCOUNTER — OFFICE VISIT (OUTPATIENT)
Dept: NEPHROLOGY | Facility: CLINIC | Age: 58
End: 2023-12-01
Payer: COMMERCIAL

## 2023-12-01 VITALS
HEART RATE: 54 BPM | SYSTOLIC BLOOD PRESSURE: 115 MMHG | DIASTOLIC BLOOD PRESSURE: 75 MMHG | WEIGHT: 220 LBS | BODY MASS INDEX: 40.48 KG/M2 | HEIGHT: 62 IN

## 2023-12-01 DIAGNOSIS — N18.31 STAGE 3A CHRONIC KIDNEY DISEASE (HCC): Primary | ICD-10-CM

## 2023-12-01 PROCEDURE — 99245 OFF/OP CONSLTJ NEW/EST HI 55: CPT | Performed by: INTERNAL MEDICINE

## 2023-12-01 PROCEDURE — 3074F SYST BP LT 130 MM HG: CPT | Performed by: INTERNAL MEDICINE

## 2023-12-01 PROCEDURE — 3008F BODY MASS INDEX DOCD: CPT | Performed by: INTERNAL MEDICINE

## 2023-12-01 PROCEDURE — 3078F DIAST BP <80 MM HG: CPT | Performed by: INTERNAL MEDICINE

## 2023-12-01 RX ORDER — MICONAZOLE NITRATE 20 MG/G
POWDER TOPICAL
COMMUNITY
Start: 2023-10-18

## 2023-12-02 NOTE — PROGRESS NOTES
12/01/23        Patient: Trista Rodriguez   YOB: 1965   Date of Visit: 12/1/2023       Dear  Dr. Gilson Haas MD,      Thank you for referring Trista Rodriguez to my practice. Please find my assessment and plan below. As you know she is a 72-year-old female with a history of prediabetes, hypertension, GERD, status post partial thyroidectomy for thyroid cancer status post pulmonary embolism who I now the pleasure of seeing for evaluation of chronic kidney disease stage III. Laboratory studies have been reviewed in [facility] and in care everywhere. When she was in [place] she had a creatinine of 1.11 and 1.12 in June 2022. More recently on June 1, 2023 she had a creatinine 1.07 and finally on July 26, 2023 she had a creatinine 1.11 with an estimated GFR of 58 cc/min and renal consultation has now been advised. Past medical history is significant for prediabetes. She is try to control this with diet and exercise. She has had hypertension since 2019. She is also status post partial thyroidectomy for thyroid cancer. In 2006 she had a hysterectomy and post operatively had a pulmonary embolism. Medications are as listed. Denies any significant use of nonsteroidals. Social history she is a non-smoker. Family history is notable for father who has some chronic kidney disease but further details are unclear but he is not on dialysis. Review of system the patient otherwise states she is doing well without any chest pain, shortness of breath, GI or urinary tract symptoms. On physical exam blood pressure was 115/75 with a pulse of 54 and she weighed 220 pounds. Her neck was supple without JVD. Lungs are clear. Heart revealed a regular rate and rhythm with an S4 but no gallops, murmurs or rubs. Abdomen was soft, flat, nontender without organomegaly, masses or bruits. Extremities revealed no clubbing, cyanosis or edema.     I therefore informed the patient that she does have chronic kidney disease stage III. This may be secondary to hypertension but other causes need to be excluded. For completion sake a repeat CBC, renal panel, urinalysis, urine for microalbumin, urine for Bence-Jason protein, sed rate, connective tissue profile and a renal ultrasound have been ordered. Further impressions and recommendations will be forthcoming after reviewing the above. There is also been some reports that omeprazole may contribute to chronic kidney disease. Unfortunately if she stops the medication she will get heartburn symptoms within a day or 2. Otherwise she knows to maintain adequate hydration. Avoid nonsteroidals. Thank you very much for allowing me to participate in the care of your patient. If you have any questions please feel free to call.       Sincerely,   Vinicius Valencia MD   [facility], [address] [place] [address]  [address] [facility] [address]  [address] [facility] [address]    Document electronically generated by:  Vinicius Valencia MD

## 2023-12-18 DIAGNOSIS — K21.9 GASTROESOPHAGEAL REFLUX DISEASE, UNSPECIFIED WHETHER ESOPHAGITIS PRESENT: ICD-10-CM

## 2023-12-18 DIAGNOSIS — I10 PRIMARY HYPERTENSION: ICD-10-CM

## 2023-12-18 RX ORDER — OMEPRAZOLE 20 MG/1
20 CAPSULE, DELAYED RELEASE ORAL
Qty: 90 CAPSULE | Refills: 0 | Status: SHIPPED | OUTPATIENT
Start: 2023-12-18

## 2023-12-18 RX ORDER — AMLODIPINE BESYLATE 10 MG/1
10 TABLET ORAL DAILY
Qty: 90 TABLET | Refills: 1 | Status: SHIPPED | OUTPATIENT
Start: 2023-12-18

## 2023-12-18 NOTE — TELEPHONE ENCOUNTER
Last time medication was refilled 09/19/2023  Quantity and # of refills 90 w 0  Last OV 06/05/2023  Next OV 01/26/2024    Sent to Dr. Kristi Lovell for approval

## 2023-12-18 NOTE — TELEPHONE ENCOUNTER
Last time medication was refilled 06/26/2023  Quantity and # of refills 90 W 1  Last OV 06/05/2023  Next OV   Future Appointments   Provider Mounika Castillo MD [facility] [address]       Passed protocol, Rx sent.

## 2023-12-28 ENCOUNTER — HOSPITAL ENCOUNTER (OUTPATIENT)
Dept: ULTRASOUND IMAGING | Age: 58
Discharge: HOME | End: 2023-12-28
Attending: INTERNAL MEDICINE
Payer: COMMERCIAL

## 2023-12-28 DIAGNOSIS — N18.31 STAGE 3A CHRONIC KIDNEY DISEASE (HCC): ICD-10-CM

## 2023-12-28 PROCEDURE — 76770 US EXAM ABDO BACK WALL COMP: CPT | Performed by: INTERNAL MEDICINE

## 2023-12-29 ENCOUNTER — TELEPHONE (OUTPATIENT)
Dept: NEPHROLOGY | Facility: CLINIC | Age: 58
End: 2023-12-29

## 2024-01-02 ENCOUNTER — HOSPITAL ENCOUNTER (OUTPATIENT)
Dept: MAMMOGRAPHY | Age: 59
Discharge: HOME | End: 2024-01-02
Attending: INTERNAL MEDICINE
Payer: COMMERCIAL

## 2024-01-02 DIAGNOSIS — Z12.31 ENCOUNTER FOR SCREENING MAMMOGRAM FOR MALIGNANT NEOPLASM OF BREAST: ICD-10-CM

## 2024-01-02 PROCEDURE — 77067 SCR MAMMO BI INCL CAD: CPT | Performed by: INTERNAL MEDICINE

## 2024-01-02 PROCEDURE — 77063 BREAST TOMOSYNTHESIS BI: CPT | Performed by: INTERNAL MEDICINE

## 2024-01-03 ENCOUNTER — LAB ENCOUNTER (OUTPATIENT)
Dept: LAB | Age: 59
End: 2024-01-03
Attending: INTERNAL MEDICINE
Payer: COMMERCIAL

## 2024-01-03 DIAGNOSIS — N18.31 STAGE 3A CHRONIC KIDNEY DISEASE (HCC): ICD-10-CM

## 2024-01-03 LAB
ALBUMIN SERPL-MCNC: 3.9 G/DL (ref 3.4–5)
ANION GAP SERPL CALC-SCNC: 4 MMOL/L (ref 0–18)
BASOPHILS # BLD AUTO: 0.03 X10(3) UL (ref 0–0.2)
BASOPHILS NFR BLD AUTO: 0.6 %
BILIRUB UR QL STRIP.AUTO: NEGATIVE
BUN BLD-MCNC: 16 MG/DL (ref 9–23)
C3 SERPL-MCNC: 137 MG/DL (ref 90–180)
C4 SERPL-MCNC: 45.5 MG/DL (ref 10–40)
CALCIUM BLD-MCNC: 9.1 MG/DL (ref 8.5–10.1)
CHLORIDE SERPL-SCNC: 108 MMOL/L (ref 98–112)
CLARITY UR REFRACT.AUTO: CLEAR
CO2 SERPL-SCNC: 27 MMOL/L (ref 21–32)
COLOR UR AUTO: COLORLESS
CREAT BLD-MCNC: 1.05 MG/DL
CREAT UR-SCNC: <13 MG/DL
CRP SERPL-MCNC: 0.83 MG/DL (ref ?–0.3)
EGFRCR SERPLBLD CKD-EPI 2021: 62 ML/MIN/1.73M2 (ref 60–?)
EOSINOPHIL # BLD AUTO: 0.08 X10(3) UL (ref 0–0.7)
EOSINOPHIL NFR BLD AUTO: 1.6 %
ERYTHROCYTE [DISTWIDTH] IN BLOOD BY AUTOMATED COUNT: 13.5 %
ERYTHROCYTE [SEDIMENTATION RATE] IN BLOOD: 21 MM/HR
GLUCOSE BLD-MCNC: 90 MG/DL (ref 70–99)
GLUCOSE UR STRIP.AUTO-MCNC: NORMAL MG/DL
HCT VFR BLD AUTO: 44.7 %
HGB BLD-MCNC: 14.6 G/DL
IMM GRANULOCYTES # BLD AUTO: 0.01 X10(3) UL (ref 0–1)
IMM GRANULOCYTES NFR BLD: 0.2 %
KETONES UR STRIP.AUTO-MCNC: NEGATIVE MG/DL
LEUKOCYTE ESTERASE UR QL STRIP.AUTO: NEGATIVE
LYMPHOCYTES # BLD AUTO: 1.35 X10(3) UL (ref 1–4)
LYMPHOCYTES NFR BLD AUTO: 26.6 %
MCH RBC QN AUTO: 29 PG (ref 26–34)
MCHC RBC AUTO-ENTMCNC: 32.7 G/DL (ref 31–37)
MCV RBC AUTO: 88.7 FL
MICROALBUMIN UR-MCNC: <0.5 MG/DL
MONOCYTES # BLD AUTO: 0.37 X10(3) UL (ref 0.1–1)
MONOCYTES NFR BLD AUTO: 7.3 %
NEUTROPHILS # BLD AUTO: 3.24 X10 (3) UL (ref 1.5–7.7)
NEUTROPHILS # BLD AUTO: 3.24 X10(3) UL (ref 1.5–7.7)
NEUTROPHILS NFR BLD AUTO: 63.7 %
NITRITE UR QL STRIP.AUTO: NEGATIVE
OSMOLALITY SERPL CALC.SUM OF ELEC: 289 MOSM/KG (ref 275–295)
PH UR STRIP.AUTO: 7 [PH] (ref 5–8)
PHOSPHATE SERPL-MCNC: 4.1 MG/DL (ref 2.5–4.9)
PLATELET # BLD AUTO: 282 10(3)UL (ref 150–450)
POTASSIUM SERPL-SCNC: 3.9 MMOL/L (ref 3.5–5.1)
PROT UR STRIP.AUTO-MCNC: NEGATIVE MG/DL
PROT UR-MCNC: <5 MG/DL
RBC # BLD AUTO: 5.04 X10(6)UL
RBC UR QL AUTO: NEGATIVE
RHEUMATOID FACT SERPL-ACNC: <10 IU/ML (ref ?–15)
SODIUM SERPL-SCNC: 139 MMOL/L (ref 136–145)
SP GR UR STRIP.AUTO: <1.005 (ref 1–1.03)
UROBILINOGEN UR STRIP.AUTO-MCNC: NORMAL MG/DL
WBC # BLD AUTO: 5.1 X10(3) UL (ref 4–11)

## 2024-01-03 PROCEDURE — 84166 PROTEIN E-PHORESIS/URINE/CSF: CPT

## 2024-01-03 PROCEDURE — 86431 RHEUMATOID FACTOR QUANT: CPT

## 2024-01-03 PROCEDURE — 84156 ASSAY OF PROTEIN URINE: CPT

## 2024-01-03 PROCEDURE — 81003 URINALYSIS AUTO W/O SCOPE: CPT

## 2024-01-03 PROCEDURE — 85652 RBC SED RATE AUTOMATED: CPT

## 2024-01-03 PROCEDURE — 86038 ANTINUCLEAR ANTIBODIES: CPT

## 2024-01-03 PROCEDURE — 82784 ASSAY IGA/IGD/IGG/IGM EACH: CPT

## 2024-01-03 PROCEDURE — 36415 COLL VENOUS BLD VENIPUNCTURE: CPT

## 2024-01-03 PROCEDURE — 86335 IMMUNFIX E-PHORSIS/URINE/CSF: CPT

## 2024-01-03 PROCEDURE — 80069 RENAL FUNCTION PANEL: CPT

## 2024-01-03 PROCEDURE — 85025 COMPLETE CBC W/AUTO DIFF WBC: CPT

## 2024-01-03 PROCEDURE — 82043 UR ALBUMIN QUANTITATIVE: CPT

## 2024-01-03 PROCEDURE — 86334 IMMUNOFIX E-PHORESIS SERUM: CPT

## 2024-01-03 PROCEDURE — 86140 C-REACTIVE PROTEIN: CPT

## 2024-01-03 PROCEDURE — 86160 COMPLEMENT ANTIGEN: CPT

## 2024-01-03 PROCEDURE — 84165 PROTEIN E-PHORESIS SERUM: CPT

## 2024-01-03 PROCEDURE — 82570 ASSAY OF URINE CREATININE: CPT

## 2024-01-04 LAB
IGA SERPL-MCNC: 204.7 MG/DL (ref 40–350)
IGM SERPL-MCNC: 88.4 MG/DL (ref 50–300)
IMMUNOGLOBULIN PNL SER-MCNC: 1341 MG/DL (ref 650–1600)

## 2024-01-05 LAB
ALBUMIN SERPL ELPH-MCNC: 4.34 G/DL (ref 3.75–5.21)
ALBUMIN/GLOB SERPL: 1.38 {RATIO} (ref 1–2)
ALPHA1 GLOB SERPL ELPH-MCNC: 0.29 G/DL (ref 0.19–0.46)
ALPHA2 GLOB SERPL ELPH-MCNC: 0.81 G/DL (ref 0.48–1.05)
B-GLOBULIN SERPL ELPH-MCNC: 0.81 G/DL (ref 0.68–1.23)
GAMMA GLOB SERPL ELPH-MCNC: 1.25 G/DL (ref 0.62–1.7)
PROT SERPL-MCNC: 7.5 G/DL (ref 5.7–8.2)

## 2024-01-08 LAB — NUCLEAR IGG TITR SER IF: NEGATIVE {TITER}

## 2024-01-11 ENCOUNTER — TELEPHONE (OUTPATIENT)
Dept: NEPHROLOGY | Facility: CLINIC | Age: 59
End: 2024-01-11

## 2024-01-24 ENCOUNTER — HOSPITAL ENCOUNTER (OUTPATIENT)
Dept: ULTRASOUND IMAGING | Age: 59
Discharge: HOME | End: 2024-01-24
Attending: STUDENT IN AN ORGANIZED HEALTH CARE EDUCATION/TRAINING PROGRAM
Payer: COMMERCIAL

## 2024-01-24 DIAGNOSIS — Z98.890 STATUS POST SUBTOTAL PARATHYROIDECTOMY: ICD-10-CM

## 2024-01-24 DIAGNOSIS — Z90.89 STATUS POST SUBTOTAL PARATHYROIDECTOMY: ICD-10-CM

## 2024-01-24 DIAGNOSIS — E89.0 POST-SURGICAL HYPOTHYROIDISM: ICD-10-CM

## 2024-01-24 DIAGNOSIS — Z85.850 HISTORY OF THYROID CANCER: ICD-10-CM

## 2024-01-24 PROCEDURE — 76536 US EXAM OF HEAD AND NECK: CPT | Performed by: STUDENT IN AN ORGANIZED HEALTH CARE EDUCATION/TRAINING PROGRAM

## 2024-01-29 ENCOUNTER — LAB ENCOUNTER (OUTPATIENT)
Dept: LAB | Age: 59
End: 2024-01-29
Attending: STUDENT IN AN ORGANIZED HEALTH CARE EDUCATION/TRAINING PROGRAM
Payer: COMMERCIAL

## 2024-01-29 DIAGNOSIS — Z90.89 STATUS POST SUBTOTAL PARATHYROIDECTOMY: ICD-10-CM

## 2024-01-29 DIAGNOSIS — Z85.850 HISTORY OF THYROID CANCER: ICD-10-CM

## 2024-01-29 DIAGNOSIS — E89.0 POST-SURGICAL HYPOTHYROIDISM: ICD-10-CM

## 2024-01-29 DIAGNOSIS — Z98.890 STATUS POST SUBTOTAL PARATHYROIDECTOMY: ICD-10-CM

## 2024-01-29 LAB
ANION GAP SERPL CALC-SCNC: 4 MMOL/L (ref 0–18)
BUN BLD-MCNC: 24 MG/DL (ref 9–23)
CALCIUM BLD-MCNC: 9.5 MG/DL (ref 8.5–10.1)
CHLORIDE SERPL-SCNC: 107 MMOL/L (ref 98–112)
CO2 SERPL-SCNC: 30 MMOL/L (ref 21–32)
CREAT BLD-MCNC: 1.03 MG/DL
EGFRCR SERPLBLD CKD-EPI 2021: 64 ML/MIN/1.73M2 (ref 60–?)
FASTING STATUS PATIENT QL REPORTED: YES
GLUCOSE BLD-MCNC: 96 MG/DL (ref 70–99)
OSMOLALITY SERPL CALC.SUM OF ELEC: 296 MOSM/KG (ref 275–295)
POTASSIUM SERPL-SCNC: 4.1 MMOL/L (ref 3.5–5.1)
PTH-INTACT SERPL-MCNC: 29 PG/ML (ref 18.5–88)
SODIUM SERPL-SCNC: 141 MMOL/L (ref 136–145)
T3 SERPL-MCNC: 82 NG/DL (ref 60–181)
T4 FREE SERPL-MCNC: 1.1 NG/DL (ref 0.8–1.7)
TSI SER-ACNC: 1.73 MIU/ML (ref 0.36–3.74)
VIT D+METAB SERPL-MCNC: 47.1 NG/ML (ref 30–100)

## 2024-01-29 PROCEDURE — 84443 ASSAY THYROID STIM HORMONE: CPT

## 2024-01-29 PROCEDURE — 84439 ASSAY OF FREE THYROXINE: CPT

## 2024-01-29 PROCEDURE — 82306 VITAMIN D 25 HYDROXY: CPT

## 2024-01-29 PROCEDURE — 83970 ASSAY OF PARATHORMONE: CPT

## 2024-01-29 PROCEDURE — 80048 BASIC METABOLIC PNL TOTAL CA: CPT

## 2024-01-29 PROCEDURE — 84480 ASSAY TRIIODOTHYRONINE (T3): CPT

## 2024-01-30 ENCOUNTER — OFFICE VISIT (OUTPATIENT)
Dept: NEPHROLOGY | Facility: CLINIC | Age: 59
End: 2024-01-30
Payer: COMMERCIAL

## 2024-01-30 VITALS
WEIGHT: 223 LBS | DIASTOLIC BLOOD PRESSURE: 78 MMHG | SYSTOLIC BLOOD PRESSURE: 118 MMHG | HEART RATE: 51 BPM | HEIGHT: 62 IN | BODY MASS INDEX: 41.04 KG/M2

## 2024-01-30 DIAGNOSIS — N18.2 CKD (CHRONIC KIDNEY DISEASE) STAGE 2, GFR 60-89 ML/MIN: Primary | ICD-10-CM

## 2024-01-30 PROCEDURE — 3008F BODY MASS INDEX DOCD: CPT | Performed by: INTERNAL MEDICINE

## 2024-01-30 PROCEDURE — 3074F SYST BP LT 130 MM HG: CPT | Performed by: INTERNAL MEDICINE

## 2024-01-30 PROCEDURE — 99213 OFFICE O/P EST LOW 20 MIN: CPT | Performed by: INTERNAL MEDICINE

## 2024-01-30 PROCEDURE — 3078F DIAST BP <80 MM HG: CPT | Performed by: INTERNAL MEDICINE

## 2024-01-30 NOTE — PROGRESS NOTES
01/30/24        Patient: Usha Last   YOB: 1965   Date of Visit: 1/30/2024       Dear  Dr. Natalya MD,      Thank you for referring Usha Last to my practice.  Please find my assessment and plan below.      As you know she is a 58-year-old female with a history of prediabetes, hypertension, GERD, status post partial thyroidectomy for thyroid cancer, status post pulmonary embolism who I now had the pleasure of seeing for follow-up of what now appears to be chronic kidney disease stage II.  The patient just underwent a recent renal evaluation.  Of note is that a sed rate, connective tissue profile, urinalysis, and random urine for Bence-Jason protein was nonrevealing.  A renal ultrasound likewise was unremarkable.  Finally repeat labs done on January 3, 2024 showed that her creatinine was better down to 1.05 with an estimated GFR of 62 cc/min.  She then had some laboratory studies done on January 29, 2024 for her endocrinologist and creatinine was 1.03 with an estimated GFR of 64 cc/min.    I therefore informed the patient that her renal function actually is a bit better.  Now with chronic kidney disease stage II.  Reinforced importance of maintaining adequate hydration.  Avoid nonsteroidals.  Keep blood pressures under good control.  Will repeat a CBC and renal panel in 3 months to ensure stability.    Thank you again for allowing me to participate in the care of your patient.  If you have any questions please feel free to call.           Sincerely,   Trace Garsia MD   [facility], [place]  [address] [facility] [address]  [facility] [address]    Document electronically generated by:  Trace Garsia MD

## 2024-02-06 ENCOUNTER — OFFICE VISIT (OUTPATIENT)
Facility: CLINIC | Age: 59
End: 2024-02-06
Payer: COMMERCIAL

## 2024-02-06 VITALS — OXYGEN SATURATION: 98 % | SYSTOLIC BLOOD PRESSURE: 122 MMHG | HEART RATE: 53 BPM | DIASTOLIC BLOOD PRESSURE: 70 MMHG

## 2024-02-06 DIAGNOSIS — E89.0 POST-SURGICAL HYPOTHYROIDISM: ICD-10-CM

## 2024-02-06 PROCEDURE — 3074F SYST BP LT 130 MM HG: CPT | Performed by: STUDENT IN AN ORGANIZED HEALTH CARE EDUCATION/TRAINING PROGRAM

## 2024-02-06 PROCEDURE — 3078F DIAST BP <80 MM HG: CPT | Performed by: STUDENT IN AN ORGANIZED HEALTH CARE EDUCATION/TRAINING PROGRAM

## 2024-02-06 PROCEDURE — 99215 OFFICE O/P EST HI 40 MIN: CPT | Performed by: STUDENT IN AN ORGANIZED HEALTH CARE EDUCATION/TRAINING PROGRAM

## 2024-02-06 RX ORDER — BLOOD SUGAR DIAGNOSTIC
STRIP MISCELLANEOUS
Qty: 200 STRIP | Refills: 0 | Status: SHIPPED | OUTPATIENT
Start: 2024-02-06

## 2024-02-06 RX ORDER — LEVOTHYROXINE SODIUM 0.05 MG/1
50 TABLET ORAL
Qty: 90 TABLET | Refills: 3 | Status: SHIPPED | OUTPATIENT
Start: 2024-02-06

## 2024-02-06 RX ORDER — LANCETS 33 GAUGE
EACH MISCELLANEOUS
Qty: 200 EACH | Refills: 0 | Status: SHIPPED | OUTPATIENT
Start: 2024-02-06

## 2024-02-06 RX ORDER — BLOOD-GLUCOSE METER
1 EACH MISCELLANEOUS DAILY
Qty: 1 KIT | Refills: 0 | Status: SHIPPED | OUTPATIENT
Start: 2024-02-06

## 2024-02-06 NOTE — PROGRESS NOTES
Endocrinology Clinic Note    Name: Usha Last    Date: 2/6/2024       HISTORY OF PRESENT ILLNESS   Usha Last is a 58 year old female with PMHx significant for Graves (in remission), multifocal PTC (s/p R lobectomy 4/2022), hx of L parathyroidectomy (4/2022), factor V leiden, hx of PE who presents for f/u    Initial HPI in Feb 2023  Transferring care from [facility].     Graves hx:  Diagnosed in 2019, s/p MMI, stopped in 9/2020 when TSH normalized  No STEW, had been seen at [facility]  1/2023 ([facility]) TSH 1.21, fT4 1.4    Thyroid CA hx:  Multifocal PTC s/p R thyroidectomy (with concomitant L parathyroidectomy) in 4/2022 at [place]  Path: multifocal, largest focus 0.9cm, classic variant   Stage: T1aN0  June 2022 - started on LT4, currently 50mcg  1/2023 ([facility]) TSH 1.21, fT4 1.4  Not done follow up thyroid US yet    Primary hyperPTH hx:  S/p L parathyroidectomy in 4/2022  Post-op needed Ca supplementation (2g), stopped since May 2022  1/2023 ([facility]) PTH 27, Ca 9.8  Eats a lot of dairy  Takes vit D 2000IU  Reports DXA 2020 was normal.    Interim hx:  July 2023 2/2023 - thyroid US shows s/p R thyroidectomy; no nodules otherwise  7/2023 - vit D, TFTs, PTH, Ca all remain normal range  Remains on LT 50mcg  Working on weight management with weight clinic and PCP; motivated to lose weight organically    Feb 2024 1/2024 - thyroid US showing R thyroidectomy, no nodules identified  1/2024 - Ca 9.1-9.5, GFR 64, vit D 47, PTH 29, fT4 1.1, TT3 82, TSH 1.7 -- LT4 50mcg  Recently seen nephrologist, CKD has improved to stage 2  Is going to see a health   Has b/l frozen shoulder, improving but not back at baseline      PAST MEDICAL HISTORY:   Past Medical History:   Diagnosis Date    Abdominal hernia     Hiatal hernia    Atypical mole     Removed; not cancer    Belching     Mild    Blood in the stool     Last instance was summer 2022    Blurred vision 2019    Diagnosed with dry eyes     Body piercing     Ears    Change in hair September, 2023    Toenail fungal infection; also ridges from plaque psoriasis    Constipation     Use Citrucel daily to help    Easy bruising February, 2023    Bruised with physical therapy massages    Eye disease     Vitreous degeneration; high myopia    Factor V Leiden ([facility]) 01/01/2006    GERD (gastroesophageal reflux disease)     Heart palpitations 2019    Have had palpitations; wore monitor in 2019 but no issues found    Heartburn 1990s    Hemorrhoids     Internal and external    History of depression 1995    Itch of skin April 20, 2023    Allergic reaction to Terbinafine medicine    Pain with bowel movements     Occasionally when have diarrhea    Presence of other cardiac implants and grafts 2019    Occular lenses (cataracts removed)    Pulmonary embolism ([facility]) 01/01/2006    Rash April 20, 2023    Allergic reaction to Terbinafine medicine    Skin blushing/flushing     With exertion    Sleep apnea 2018    Mild    Sleep disturbance     Occasionally have trouble staying asleep    Thyroid disease 2019    Grave’s disease in remission    Wears glasses     For reading; had Lasik and cataract surgery       PAST SURGICAL HISTORY:   Past Surgical History:   Procedure Laterality Date    COLONOSCOPY  Last in 2018    Have had 4    HYSTERECTOMY      May 2006    NEEDLE BIOPSY LEFT  2010    dr hinojosa's office    OTHER SURGICAL HISTORY  04/20/2022    Thyroid and Parathyroid Gland Removal    THYROIDECTOMY  April 20, 2023    [facility] - right lobe removed due to cancer; also had a parathyroid gland removed    TOTAL ABDOM HYSTERECTOMY  May 24, 2006    Supracervical       CURRENT MEDICATIONS:    Current Outpatient Medications   Medication Sig Dispense Refill    Blood Glucose Monitoring Suppl (ONETOUCH VERIO) w/Device Does not apply Kit 1 each daily. 1 kit 0    Glucose Blood (ONETOUCH VERIO) In Vitro Strip Test 2x daily 200 strip 0    OneTouch Delica Lancets 33G Does not apply Misc Test 2 x  daily 200 each 0    levothyroxine 50 MCG Oral Tab Take 1 tablet (50 mcg total) by mouth before breakfast. 90 tablet 3    AMLODIPINE 10 MG Oral Tab TAKE 1 TABLET(10 MG) BY MOUTH DAILY 90 tablet 1    omeprazole 20 MG Oral Capsule Delayed Release Take 1 capsule (20 mg total) by mouth before breakfast. 90 capsule 0    miconazole (ZEASORB-AF) 2 % External Powder       propranolol 10 MG Oral Tab Take 1 tablet (10 mg total) by mouth 2 (two) times daily. 180 tablet 3    EPINEPHrine 0.3 MG/0.3ML Injection Solution Auto-injector Inject 1 mL (3.3333 each total) into the muscle daily.      Glycerin, PF, (OASIS TEARS PLUS PF) 0.22 % Ophthalmic Solution Apply to eye in the morning, at noon, in the evening, and at bedtime.      Cholecalciferol (VITAMIN D3) 50 MCG (2000 UT) Oral Chew Tab       Methylcellulose, Laxative, (CITRUCEL) 500 MG Oral Tab Take 2,000 mg by mouth at bedtime.      Cobalamin Combinations (B12 FOLATE) 800-800 MCG Oral Cap Take 800 mcg by mouth daily.       Endocrine Medications            levothyroxine 50 MCG Oral Tab            ALLERGIES:  Allergies   Allergen Reactions    Cat Hair Extract SWELLING     Causes throat itching, coughing and sneezing    Terbinafine HIVES, RASH, ITCHING and SWELLING    Erythromycin PAIN and NAUSEA AND VOMITING     Stomach pain    Amoxicillin-Pot Clavulanate RASH    Nitrofurantoin RASH       SOCIAL HISTORY:    Social History     Socioeconomic History    Marital status: Single   Tobacco Use    Smoking status: Never    Smokeless tobacco: Never   Vaping Use    Vaping Use: Never used   Substance and Sexual Activity    Alcohol use: Not Currently    Drug use: Never       FAMILY HISTORY:   Family History   Problem Relation Age of Onset    Hypertension Mother             Heart Attack Mother             Colon Polyps Father     Stroke Maternal Grandfather             Breast Cancer Maternal Grandmother             Diabetes Paternal Grandfather              Colon Polyps Brother          REVIEW OF SYSTEMS:  Ten point review of systems has been performed and is otherwise negative and/or non-contributory, except as described above.      PHYSICAL EXAM:   Vitals:    02/06/24 1402   BP: 122/70   Pulse: 53   SpO2: 98%       BMI: There is no height or weight on file to calculate BMI.     CONSTITUTIONAL:  awake, alert, cooperative, no apparent distress, and appears stated age  PSYCH: normal affect  LUNGS: breathing comfortably  CARDIOVASCULAR:  regular rate       DATA:     Pertinent data reviewed      ASSESSMENT AND PLAN:    (Z85.850) History of thyroid cancer  (primary encounter diagnosis)  Plan:     Thyroid cancer:  Treatment history   Surgery: R lobectomy on 4/2022 by Dr. Chandra ([place] endocrine surgeon)    Path showed: multifocal PTC, classic variant, largest tumor 0.9cm; opted no completion thyroidectomy    Stage: T1N0   Remnant ablation none    Surveillance history   Thyroid US: 2/2023 unremarkable   Labs: 1/2023 TSH 1.21, fT4 1.4; TSH goal 0.5-2.0  1/2024 - fT4 1.1, TT3 82, TSH 1.7 -- LT4 50mcg    - next thyroid US ~Jan 2026, sooner if sx   - labs prior to next appointment     (E89.2) Status post subtotal parathyroidectomy (HCC)  Plan:   S/p L parathyroidectomy in April 2022; briefly needed post-op Ca  1/2023 and 7/2023 and 1/2024 with normal PTH and Ca  - no PTH surveillance, monitor BMP    (E05.00) Graves' disease in remission  Plan:   S/p MMI [id number], then s/p lobectomy for PTC. Currently on LT4 50mcg  - 1 yr refill given  - TFTs q6 months (once with PCP, once with endo)        Return to Clinic in 1 year         2/6/2024  Tae Hamilton MD

## 2024-02-15 ENCOUNTER — OFFICE VISIT (OUTPATIENT)
Dept: INTERNAL MEDICINE CLINIC | Facility: CLINIC | Age: 59
End: 2024-02-15
Payer: COMMERCIAL

## 2024-02-15 VITALS
HEART RATE: 57 BPM | HEIGHT: 62 IN | RESPIRATION RATE: 16 BRPM | BODY MASS INDEX: 41.22 KG/M2 | SYSTOLIC BLOOD PRESSURE: 114 MMHG | OXYGEN SATURATION: 96 % | DIASTOLIC BLOOD PRESSURE: 78 MMHG | TEMPERATURE: 97 F | WEIGHT: 224 LBS

## 2024-02-15 DIAGNOSIS — I10 PRIMARY HYPERTENSION: Primary | ICD-10-CM

## 2024-02-15 PROCEDURE — 3074F SYST BP LT 130 MM HG: CPT | Performed by: INTERNAL MEDICINE

## 2024-02-15 PROCEDURE — 3078F DIAST BP <80 MM HG: CPT | Performed by: INTERNAL MEDICINE

## 2024-02-15 PROCEDURE — 3008F BODY MASS INDEX DOCD: CPT | Performed by: INTERNAL MEDICINE

## 2024-02-15 PROCEDURE — 99213 OFFICE O/P EST LOW 20 MIN: CPT | Performed by: INTERNAL MEDICINE

## 2024-02-15 RX ORDER — CHLORHEXIDINE GLUCONATE ORAL RINSE 1.2 MG/ML
SOLUTION DENTAL
COMMUNITY
Start: 2024-02-14

## 2024-02-15 RX ORDER — DOXYCYCLINE 100 MG/1
CAPSULE ORAL
COMMUNITY
Start: 2024-02-14

## 2024-02-15 RX ORDER — VIT B12/INTRINSIC FACT/FOLATE 500-20-800
TABLET ORAL
COMMUNITY

## 2024-02-15 NOTE — PROGRESS NOTES
Subjective:   Patient ID: Usha Last is a 58 year old female.    Hypertension    Medication Follow-Up      Usha Last is a 58 year old female.     HPI:   Patient presents for recheck of her hypertension. Pt has been taking medications as instructed, no medication side effects, home BP monitoring in the range of 130's systolic and 70's diastolic.    Wt Readings from Last 6 Encounters:   02/15/24 224 lb (101.6 kg)   01/30/24 223 lb (101.2 kg)   12/01/23 220 lb (99.8 kg)   06/05/23 217 lb (98.4 kg)   05/23/23 215 lb (97.5 kg)   04/25/23 215 lb (97.5 kg)     Body mass index is 40.97 kg/m².    Lab Results   Component Value Date    CHOLEST 180 06/01/2023     Lab Results   Component Value Date    HDL 66 (H) 06/01/2023     Lab Results   Component Value Date    TRIG 53 06/01/2023     Lab Results   Component Value Date     (H) 06/01/2023     Lab Results   Component Value Date    AST 19 06/01/2023     Lab Results   Component Value Date    ALT 22 06/01/2023     Lab Results   Component Value Date    GLU 96 01/29/2024    GLU 90 01/03/2024    GLU 91 07/26/2023       Current Outpatient Medications   Medication Sig Dispense Refill    Doxycycline Monohydrate 100 MG Oral Cap       chlorhexidine gluconate 0.12 % Mouth/Throat Solution       Folate-B12-Intrinsic Factor (INTRINSI B12-FOLATE) 800-500-20 MCG-MCG-MG Oral Tab       Blood Glucose Monitoring Suppl (ONETOUCH VERIO) w/Device Does not apply Kit 1 each daily. 1 kit 0    Glucose Blood (ONETOUCH VERIO) In Vitro Strip Test 2x daily 200 strip 0    OneTouch Delica Lancets 33G Does not apply Misc Test 2 x daily 200 each 0    levothyroxine 50 MCG Oral Tab Take 1 tablet (50 mcg total) by mouth before breakfast. 90 tablet 3    AMLODIPINE 10 MG Oral Tab TAKE 1 TABLET(10 MG) BY MOUTH DAILY 90 tablet 1    omeprazole 20 MG Oral Capsule Delayed Release Take 1 capsule (20 mg total) by mouth before breakfast. 90 capsule 0    EPINEPHrine 0.3 MG/0.3ML Injection Solution  Auto-injector Inject 1 mL (3.3333 each total) into the muscle daily.      Glycerin, PF, (OASIS TEARS PLUS PF) 0.22 % Ophthalmic Solution Apply to eye in the morning, at noon, in the evening, and at bedtime.      Cholecalciferol (VITAMIN D3) 50 MCG (2000 UT) Oral Chew Tab       Methylcellulose, Laxative, (CITRUCEL) 500 MG Oral Tab Take 2,000 mg by mouth at bedtime.        Past Medical History:   Diagnosis Date    Abdominal hernia     Hiatal hernia    Atypical mole     Removed; not cancer    Belching     Mild    Blood in the stool     Last instance was summer 2022    Blurred vision 2019    Diagnosed with dry eyes    Body piercing     Ears    Change in hair September, 2023    Toenail fungal infection; also ridges from plaque psoriasis    Constipation     Use Citrucel daily to help    Easy bruising February, 2023    Bruised with physical therapy massages    Eye disease     Vitreous degeneration; high myopia    Factor V Leiden ([facility]) 01/01/2006    GERD (gastroesophageal reflux disease)     Heart palpitations 2019    Have had palpitations; wore monitor in 2019 but no issues found    Heartburn 1990s    Hemorrhoids     Internal and external    History of depression 1995    Itch of skin April 20, 2023    Allergic reaction to Terbinafine medicine    Pain with bowel movements     Occasionally when have diarrhea    Presence of other cardiac implants and grafts 2019    Occular lenses (cataracts removed)    Pulmonary embolism ([facility]) 01/01/2006    Rash April 20, 2023    Allergic reaction to Terbinafine medicine    Skin blushing/flushing     With exertion    Sleep apnea 2018    Mild    Sleep disturbance     Occasionally have trouble staying asleep    Thyroid disease 2019    Grave’s disease in remission    Wears glasses     For reading; had Lasik and cataract surgery      Past Surgical History:   Procedure Laterality Date    COLONOSCOPY  Last in 2018    Have had 4    HYSTERECTOMY      May 2006    NEEDLE BIOPSY LEFT  2010      [place]    OTHER SURGICAL HISTORY  04/20/2022    Thyroid and Parathyroid Gland Removal    THYROIDECTOMY  April 20, 2023    [facility] - right lobe removed due to cancer; also had a parathyroid gland removed    TOTAL ABDOM HYSTERECTOMY  May 24, 2006    Supracervical      Social History:    Social History     Socioeconomic History    Marital status: Single   Tobacco Use    Smoking status: Never    Smokeless tobacco: Never   Vaping Use    Vaping Use: Never used   Substance and Sexual Activity    Alcohol use: Not Currently    Drug use: Never         REVIEW OF SYSTEMS:   GENERAL HEALTH: feels well otherwise  SKIN: denies any unusual skin lesions or rashes  RESPIRATORY: denies shortness of breath with exertion  CARDIOVASCULAR: denies chest pain on exertion  GI: denies abdominal pain and denies heartburn  NEURO: denies headaches    EXAM:   /78   Pulse 57   Temp 97.1 °F (36.2 °C)   Resp 16   Ht 5' 2\" (1.575 m)   Wt 224 lb (101.6 kg)   SpO2 96%   BMI 40.97 kg/m²   GENERAL: well developed, well nourished,in no apparent distress  SKIN: no rashes,no suspicious lesions  HEENT: atraumatic, normocephalic,ears and throat are clear  NECK: supple,no adenopathy,no bruits  LUNGS: clear to auscultation  CARDIO: RRR without murmur  GI: good BS's,no masses, HSM or tenderness  EXTREMITIES: no cyanosis, clubbing or edema    ASSESSMENT AND PLAN:   Pt presents for a recheck of her hypertension. BP is well controlled, no significant medication side effects noted.  PLAN: will continue present medications, reviewed diet, exercise and weight control. The patient indicates understanding of these issues and agrees to the plan.  The patient is asked to return in 6 m.    History/Other:   Review of Systems  Current Outpatient Medications   Medication Sig Dispense Refill    Doxycycline Monohydrate 100 MG Oral Cap       chlorhexidine gluconate 0.12 % Mouth/Throat Solution       Folate-B12-Intrinsic Factor (INTRINSI B12-FOLATE) 800-500-20  MCG-MCG-MG Oral Tab       Blood Glucose Monitoring Suppl (ONETOUCH VERIO) w/Device Does not apply Kit 1 each daily. 1 kit 0    Glucose Blood (ONETOUCH VERIO) In Vitro Strip Test 2x daily 200 strip 0    OneTouch Delica Lancets 33G Does not apply Misc Test 2 x daily 200 each 0    levothyroxine 50 MCG Oral Tab Take 1 tablet (50 mcg total) by mouth before breakfast. 90 tablet 3    AMLODIPINE 10 MG Oral Tab TAKE 1 TABLET(10 MG) BY MOUTH DAILY 90 tablet 1    omeprazole 20 MG Oral Capsule Delayed Release Take 1 capsule (20 mg total) by mouth before breakfast. 90 capsule 0    EPINEPHrine 0.3 MG/0.3ML Injection Solution Auto-injector Inject 1 mL (3.3333 each total) into the muscle daily.      Glycerin, PF, (OASIS TEARS PLUS PF) 0.22 % Ophthalmic Solution Apply to eye in the morning, at noon, in the evening, and at bedtime.      Cholecalciferol (VITAMIN D3) 50 MCG (2000 UT) Oral Chew Tab       Methylcellulose, Laxative, (CITRUCEL) 500 MG Oral Tab Take 2,000 mg by mouth at bedtime.       Allergies:  Allergies   Allergen Reactions    Cat Hair Extract SWELLING     Causes throat itching, coughing and sneezing    Terbinafine HIVES, RASH, ITCHING and SWELLING    Erythromycin PAIN and NAUSEA AND VOMITING     Stomach pain    Amoxicillin-Pot Clavulanate RASH    Nitrofurantoin RASH       Objective:   Physical Exam    Assessment & Plan:   1. Primary hypertension        No orders of the defined types were placed in this encounter.      Meds This Visit:  Requested Prescriptions      No prescriptions requested or ordered in this encounter       Imaging & Referrals:  None

## 2024-06-24 ENCOUNTER — OFFICE VISIT (OUTPATIENT)
Dept: INTERNAL MEDICINE CLINIC | Facility: CLINIC | Age: 59
End: 2024-06-24

## 2024-06-24 VITALS
DIASTOLIC BLOOD PRESSURE: 64 MMHG | TEMPERATURE: 97 F | WEIGHT: 231 LBS | OXYGEN SATURATION: 97 % | SYSTOLIC BLOOD PRESSURE: 120 MMHG | RESPIRATION RATE: 16 BRPM | BODY MASS INDEX: 42.51 KG/M2 | HEIGHT: 62 IN | HEART RATE: 67 BPM

## 2024-06-24 DIAGNOSIS — Z00.00 ANNUAL PHYSICAL EXAM: Primary | ICD-10-CM

## 2024-06-24 DIAGNOSIS — K21.9 GASTROESOPHAGEAL REFLUX DISEASE, UNSPECIFIED WHETHER ESOPHAGITIS PRESENT: ICD-10-CM

## 2024-06-24 DIAGNOSIS — J01.10 ACUTE NON-RECURRENT FRONTAL SINUSITIS: ICD-10-CM

## 2024-06-24 DIAGNOSIS — Z00.00 ROUTINE GENERAL MEDICAL EXAMINATION AT A HEALTH CARE FACILITY: ICD-10-CM

## 2024-06-24 DIAGNOSIS — I10 PRIMARY HYPERTENSION: ICD-10-CM

## 2024-06-24 RX ORDER — OMEPRAZOLE 20 MG/1
20 CAPSULE, DELAYED RELEASE ORAL
Qty: 90 CAPSULE | Refills: 1 | Status: SHIPPED | OUTPATIENT
Start: 2024-06-24

## 2024-06-24 RX ORDER — AMLODIPINE BESYLATE 10 MG/1
10 TABLET ORAL DAILY
Qty: 90 TABLET | Refills: 1 | Status: SHIPPED | OUTPATIENT
Start: 2024-06-24

## 2024-06-24 RX ORDER — PREDNISONE 20 MG/1
TABLET ORAL
Qty: 10 TABLET | Refills: 0 | Status: SHIPPED | OUTPATIENT
Start: 2024-06-24

## 2024-06-24 NOTE — PROGRESS NOTES
Subjective:   Patient ID: Usha Last is a 59 year old female.    Headache       HPI:   Usha Last is a 59 year old female who presents for a complete physical exam. Symptoms: denies discharge, itching, burning or dysuria, is menopausal. Patient complains of left temporal headache. Acute onset. Occurred .a few days ago. Some sinus pressure. Left ear pain. No fever.     PAST MEDICAL, SOCIAL, FAMILY HISTORIES REVIEWED WITH PT      Immunization History   Administered Date(s) Administered    Covid-19 Vaccine Moderna 100 mcg/0.5 ml 04/06/2021, 05/04/2021, 11/24/2021    FLULAVAL 6 months & older 0.5 ml Prefilled syringe (14797) 12/22/2022    FLUZONE 6 months and older PFS 0.5 ml (81110) 10/22/2019, 09/25/2020, 09/24/2021, 11/29/2023    Influenza 09/30/2017    TDAP 01/01/2012, 08/19/2022, 08/25/2022    Zoster Vaccine Recombinant Adjuvanted (Shingrix) 01/04/2022, 06/24/2022     Wt Readings from Last 6 Encounters:   06/24/24 231 lb (104.8 kg)   02/15/24 224 lb (101.6 kg)   01/30/24 223 lb (101.2 kg)   12/01/23 220 lb (99.8 kg)   06/05/23 217 lb (98.4 kg)   05/23/23 215 lb (97.5 kg)     Body mass index is 42.25 kg/m².     Lab Results   Component Value Date    GLU 96 01/29/2024    GLU 90 01/03/2024    GLU 91 07/26/2023     Lab Results   Component Value Date    CHOLEST 180 06/01/2023     Lab Results   Component Value Date    HDL 66 (H) 06/01/2023     Lab Results   Component Value Date     (H) 06/01/2023     Lab Results   Component Value Date    AST 19 06/01/2023     Lab Results   Component Value Date    ALT 22 06/01/2023       Current Outpatient Medications   Medication Sig Dispense Refill    Blood Glucose Monitoring Suppl (ONETOUCH VERIO) w/Device Does not apply Kit 1 each daily. 1 kit 0    Glucose Blood (ONETOUCH VERIO) In Vitro Strip Test 2x daily 200 strip 0    OneTouch Delica Lancets 33G Does not apply Misc Test 2 x daily 200 each 0    levothyroxine 50 MCG Oral Tab Take 1 tablet (50 mcg total)  by mouth before breakfast. 90 tablet 3    AMLODIPINE 10 MG Oral Tab TAKE 1 TABLET(10 MG) BY MOUTH DAILY 90 tablet 1    omeprazole 20 MG Oral Capsule Delayed Release Take 1 capsule (20 mg total) by mouth before breakfast. 90 capsule 0    EPINEPHrine 0.3 MG/0.3ML Injection Solution Auto-injector Inject 1 mL (3.3333 each total) into the muscle daily.      Glycerin, PF, (OASIS TEARS PLUS PF) 0.22 % Ophthalmic Solution Apply to eye in the morning, at noon, in the evening, and at bedtime.      Cholecalciferol (VITAMIN D3) 50 MCG (2000 UT) Oral Chew Tab       Methylcellulose, Laxative, (CITRUCEL) 500 MG Oral Tab Take 2,000 mg by mouth at bedtime.        Past Medical History:    Abdominal hernia    Hiatal hernia    Atypical mole    Removed; not cancer    Belching    Mild    Blood in the stool    Last instance was summer 2022    Blurred vision    Diagnosed with dry eyes    Body piercing    Ears    Change in hair    Toenail fungal infection; also ridges from plaque psoriasis    Constipation    Use Citrucel daily to help    Easy bruising    Bruised with physical therapy massages    Eye disease    Vitreous degeneration; high myopia    Factor V Leiden ([facility])    GERD (gastroesophageal reflux disease)    Heart palpitations    Have had palpitations; wore monitor in 2019 but no issues found    Heartburn    Hemorrhoids    Internal and external    History of depression    Itch of skin    Allergic reaction to Terbinafine medicine    Pain with bowel movements    Occasionally when have diarrhea    Presence of other cardiac implants and grafts    Occular lenses (cataracts removed)    Pulmonary embolism ([facility])    Rash    Allergic reaction to Terbinafine medicine    Skin blushing/flushing    With exertion    Sleep apnea    Mild    Sleep disturbance    Occasionally have trouble staying asleep    Thyroid disease    Grave’s disease in remission    Wears glasses    For reading; had Lasik and cataract surgery      Past Surgical History:    Procedure Laterality Date    Colonoscopy  Last in 2018    Have had 4    Hysterectomy      May 2006    Needle biopsy left  2010    dr hniojosa[facility] office    Other surgical history  2022    Thyroid and Parathyroid Gland Removal    Thyroidectomy  2023    [facility] - right lobe removed due to cancer; also had a parathyroid gland removed    Total abdom hysterectomy  May 24, 2006    Supracervical      Family History   Problem Relation Age of Onset    Hypertension Mother             Heart Attack Mother             Colon Polyps Father     Stroke Maternal Grandfather             Breast Cancer Maternal Grandmother             Diabetes Paternal Grandfather             Colon Polyps Brother       Social History:   Social History     Socioeconomic History    Marital status: Single   Tobacco Use    Smoking status: Never    Smokeless tobacco: Never   Vaping Use    Vaping status: Never Used   Substance and Sexual Activity    Alcohol use: Not Currently    Drug use: Never     Occ: yes. .   Exercise: once per week,  twice per week.  Diet: watches fats closely and watches sugar closely     REVIEW OF SYSTEMS:   A comprehensive 10 point review of systems was completed.     Pertinent positives and negatives noted in the HPI.      EXAM:   /64   Pulse 67   Temp 97 °F (36.1 °C)   Resp 16   Ht 5' 2\" (1.575 m)   Wt 231 lb (104.8 kg)   SpO2 97%   BMI 42.25 kg/m²   Body mass index is 42.25 kg/m².   GENERAL: well developed, well nourished,in no apparent distress  SKIN: no rashes,no suspicious lesions  HEENT: atraumatic, normocephalic,ears and throat are clear,left mastoid tenderness  EYES:PERRLA, EOMI, conjunctiva are clear  NECK: supple,no adenopathy,no bruits  LUNGS: clear to auscultation  CARDIO: RRR without murmur  GI: good BS's,no masses, HSM or tenderness  :deferred  MUSCULOSKELETAL: back is not tender  EXTREMITIES: no cyanosis, clubbing or edema  NEURO: Oriented times  three,motor and sensory are grossly intact    ASSESSMENT AND PLAN:   Usha Last is a 59 year old female who presents for a complete physical exam.  Pap and pelvic done by gyne  Order put in for mammogram   Health maintenance, will check fasting Lipids, CMP, and CBC.   UTD with screening colonoscopy.     Cont current med plan    Treat left mastroiditis/sinusitis with prednisone burst as ordered  Pt info handouts given for: exercise, low fat diet     Body mass index is 42.25 kg/m²., recommended low fat diet   The patient indicates understanding of these issues and agrees to the plan.  The patient is asked to return for CPX in 12 m.    History/Other:   Review of Systems   Neurological:  Positive for headaches.     Current Outpatient Medications   Medication Sig Dispense Refill    Blood Glucose Monitoring Suppl (ONETOUCH VERIO) w/Device Does not apply Kit 1 each daily. 1 kit 0    Glucose Blood (ONETOUCH VERIO) In Vitro Strip Test 2x daily 200 strip 0    OneTouch Delica Lancets 33G Does not apply Misc Test 2 x daily 200 each 0    levothyroxine 50 MCG Oral Tab Take 1 tablet (50 mcg total) by mouth before breakfast. 90 tablet 3    AMLODIPINE 10 MG Oral Tab TAKE 1 TABLET(10 MG) BY MOUTH DAILY 90 tablet 1    omeprazole 20 MG Oral Capsule Delayed Release Take 1 capsule (20 mg total) by mouth before breakfast. 90 capsule 0    EPINEPHrine 0.3 MG/0.3ML Injection Solution Auto-injector Inject 1 mL (3.3333 each total) into the muscle daily.      Glycerin, PF, (OASIS TEARS PLUS PF) 0.22 % Ophthalmic Solution Apply to eye in the morning, at noon, in the evening, and at bedtime.      Cholecalciferol (VITAMIN D3) 50 MCG (2000 UT) Oral Chew Tab       Methylcellulose, Laxative, (CITRUCEL) 500 MG Oral Tab Take 2,000 mg by mouth at bedtime.       Allergies:  Allergies   Allergen Reactions    Cat Hair Extract SWELLING     Causes throat itching, coughing and sneezing    Terbinafine HIVES, RASH, ITCHING and SWELLING     Erythromycin PAIN and NAUSEA AND VOMITING     Stomach pain    Amoxicillin-Pot Clavulanate RASH    Nitrofurantoin RASH       Objective:   Physical Exam    Assessment & Plan:   1. Annual physical exam        No orders of the defined types were placed in this encounter.      Meds This Visit:  Requested Prescriptions      No prescriptions requested or ordered in this encounter       Imaging & Referrals:  None     Quality 431: Preventive Care And Screening: Unhealthy Alcohol Use - Screening: Documentation of medical reason(s) for not screening for unhealthy alcohol use (e.g., limited life expectancy, other medical reasons) Quality 110: Preventive Care And Screening: Influenza Immunization: Influenza Immunization Administered during Influenza season Quality 226: Preventive Care And Screening: Tobacco Use: Screening And Cessation Intervention: Patient screened for tobacco use and is an ex/non-smoker Quality 111:Pneumonia Vaccination Status For Older Adults: Pneumococcal vaccine (PPSV23) administered on or after patient’s 60th birthday and before the end of the measurement period Detail Level: Detailed Quality 130: Documentation Of Current Medications In The Medical Record: Current Medications Documented

## 2024-07-12 ENCOUNTER — OFFICE VISIT (OUTPATIENT)
Dept: OTOLARYNGOLOGY | Facility: CLINIC | Age: 59
End: 2024-07-12
Payer: COMMERCIAL

## 2024-07-12 VITALS — WEIGHT: 231 LBS | HEIGHT: 62 IN | BODY MASS INDEX: 42.51 KG/M2

## 2024-07-12 DIAGNOSIS — R44.8 FACIAL PRESSURE: ICD-10-CM

## 2024-07-12 DIAGNOSIS — H93.8X2 PRESSURE SENSATION IN LEFT EAR: Primary | ICD-10-CM

## 2024-07-12 DIAGNOSIS — F45.8 BRUXISM: ICD-10-CM

## 2024-07-12 NOTE — PROGRESS NOTES
Usha Last is a 59 year old female.   Chief Complaint   Patient presents with    New Patient    Sinus Problem     Patient is here for sinus issues, pressure in ear, pressure headache.      HPI:   59-year-old presents with pressure of her left ear and head area.  She is going on a trip to [place] in a few days.  She tried a course of oral steroids.  This hesitant to start Flonase due to history of cataracts.    Current Outpatient Medications   Medication Sig Dispense Refill    amLODIPine 10 MG Oral Tab Take 1 tablet (10 mg total) by mouth daily. 90 tablet 1    omeprazole 20 MG Oral Capsule Delayed Release Take 1 capsule (20 mg total) by mouth before breakfast. 90 capsule 1    Blood Glucose Monitoring Suppl (ONETOUCH VERIO) w/Device Does not apply Kit 1 each daily. 1 kit 0    Glucose Blood (ONETOUCH VERIO) In Vitro Strip Test 2x daily 200 strip 0    OneTouch Delica Lancets 33G Does not apply Misc Test 2 x daily 200 each 0    levothyroxine 50 MCG Oral Tab Take 1 tablet (50 mcg total) by mouth before breakfast. 90 tablet 3    EPINEPHrine 0.3 MG/0.3ML Injection Solution Auto-injector Inject 1 mL (3.3333 each total) into the muscle daily.      Glycerin, PF, (OASIS TEARS PLUS PF) 0.22 % Ophthalmic Solution Apply to eye in the morning, at noon, in the evening, and at bedtime.      Cholecalciferol (VITAMIN D3) 50 MCG (2000 UT) Oral Chew Tab       Methylcellulose, Laxative, (CITRUCEL) 500 MG Oral Tab Take 2,000 mg by mouth at bedtime.      predniSONE 20 MG Oral Tab Take 2 tablets by mouth daily x 5 days 10 tablet 0      Past Medical History:    Abdominal hernia    Hiatal hernia    Atypical mole    Removed; not cancer    Belching    Mild    Blood in the stool    Last instance was summer 2022    Blurred vision    Diagnosed with dry eyes    Body piercing    Ears    Change in hair    Toenail fungal infection; also ridges from plaque psoriasis    Constipation    Use Citrucel daily to help    Easy bruising    Bruised with  physical therapy massages    Eye disease    Vitreous degeneration; high myopia    Factor V Leiden ([facility])    GERD (gastroesophageal reflux disease)    Heart palpitations    Have had palpitations; wore monitor in 2019 but no issues found    Heartburn    Hemorrhoids    Internal and external    History of depression    Itch of skin    Allergic reaction to Terbinafine medicine    Pain with bowel movements    Occasionally when have diarrhea    Presence of other cardiac implants and grafts    Occular lenses (cataracts removed)    Pulmonary embolism ([facility])    Rash    Allergic reaction to Terbinafine medicine    Skin blushing/flushing    With exertion    Sleep apnea    Mild    Sleep disturbance    Occasionally have trouble staying asleep    Thyroid disease    Grave’s disease in remission    Wears glasses    For reading; had Lasik and cataract surgery      Social History:  Social History     Socioeconomic History    Marital status: Single   Tobacco Use    Smoking status: Never    Smokeless tobacco: Never   Vaping Use    Vaping status: Never Used   Substance and Sexual Activity    Alcohol use: Not Currently    Drug use: Never      Past Surgical History:   Procedure Laterality Date    Colonoscopy  Last in 2018    Have had 4    Hysterectomy      May 2006    Needle biopsy left  2010    dr [facility]inoj[facility] office    Other surgical history  04/20/2022    Thyroid and Parathyroid Gland Removal    Thyroidectomy  April 20, 2023    [facility] - right lobe removed due to cancer; also had a parathyroid gland removed    Total abdom hysterectomy  May 24, 2006    Supracervical         EXAM:   Ht 5' 2\" (1.575 m)   Wt 231 lb (104.8 kg)   BMI 42.25 kg/m²     System Details   Skin Inspection - Normal.   Constitutional Overall appearance - Normal.   Head/Face Symmetric, TMJ tenderness not present    Eyes EOMI, PERRL   Right ear:  Canal clear, TM intact, no HELENE   Left ear:  Canal clear, TM intact, no HELENE   Nose: Septum deviated to the left, inferior turbinates  not enlarged, nasal valves without collapse    Oral cavity/Oropharynx: No lesions or masses on inspection or palpation, tonsils symmetric    Neck: Soft without LAD, thyroid not enlarged  Voice clear/ no stridor   Other:      SCOPES AND PROCEDURES:     Nasal Endoscopy Procedure Note     Due to inability for adequate examination of the nose and nasopharynx and need for magnification to perform the examination, endoscopy was performed.  Risks and benefits were discussed with patient/family and they have given verbal consent to proceed.    Pre-operative Diagnosis:   1. Pressure sensation in left ear    2. Facial pressure    3. Bruxism        Post-operative Diagnosis: Same    Procedure: Diagnostic nasal endoscopy    Anesthesia: Topical anesthetic [place]     Surgeon Pravin Peoples MD    EBL: 0cc    Procedure Detail & Findings:     After placement of topical anesthetic intranasally the endoscope was inserted into each nares and driven through the nasal cavity into the nasopharynx. The following findings were noted:    Septum: Midline  Inferior turbinates: Normal  Middle meatus: Patent  Middle turbinates: Normal  Purulence: None noted  Polyps: None noted  Nasopharynx and eustachian tube: No masses  Other: The middle and superior meatus, the turbinates, and the spheno-ethmoid recess were inspected and seen to be without significant abnormal findings.     Condition: Stable    Complications: Patient tolerated the procedure well with no immediate complication.    Pravin Peoples MD    AUDIOGRAM AND IMAGING:     Type a tympanograms bilaterally    IMPRESSION:   1. Pressure sensation in left ear  - Audiology Exam    2. Facial pressure    3. Bruxism       Recommendations:  -Did not see any otitis/ HELENE and she had type a tympanograms today indicating normal eustachian tube function.  Also there is no purulence or signs of sinusitis on the nasal endoscopy.  Rather her ear pressure may be referred from a facial pain process or a TMJ  disorder she does have a history of bruxism.  Allergies may also be contributing to her facial pressure  -Discussed starting Flonase as well as an oral antihistamine and I gave her a handout regarding flying with eustachian tube dysfunction as she does develop ear pressure due to the flight to [place]  -If she continues to have issues we will consider an antibiotic given she does have facial pressure when bending forward with which can be a sign of sinusitis or meeting with her dentist regarding her bruxism    The patient indicates understanding of these issues and agrees to the plan.  Consult from Dr Hoang regarding sinus and ear evaluation    Pravin Peoples MD  7/12/2024  8:11 AM

## 2024-08-08 ENCOUNTER — MED REC SCAN ONLY (OUTPATIENT)
Dept: INTERNAL MEDICINE CLINIC | Facility: CLINIC | Age: 59
End: 2024-08-08

## 2024-10-21 ENCOUNTER — MED REC SCAN ONLY (OUTPATIENT)
Dept: INTERNAL MEDICINE CLINIC | Facility: CLINIC | Age: 59
End: 2024-10-21

## 2024-12-04 ENCOUNTER — HOSPITAL ENCOUNTER (EMERGENCY)
Facility: HOSPITAL | Age: 59
Discharge: HOME | End: 2024-12-04
Attending: EMERGENCY MEDICINE
Payer: COMMERCIAL

## 2024-12-04 ENCOUNTER — APPOINTMENT (OUTPATIENT)
Dept: ULTRASOUND IMAGING | Facility: HOSPITAL | Age: 59
End: 2024-12-04
Attending: EMERGENCY MEDICINE
Payer: COMMERCIAL

## 2024-12-04 ENCOUNTER — ANESTHESIA EVENT (OUTPATIENT)
Dept: SURGERY | Facility: HOSPITAL | Age: 59
End: 2024-12-04
Payer: COMMERCIAL

## 2024-12-04 ENCOUNTER — APPOINTMENT (OUTPATIENT)
Dept: GENERAL RADIOLOGY | Facility: HOSPITAL | Age: 59
End: 2024-12-04
Attending: EMERGENCY MEDICINE
Payer: COMMERCIAL

## 2024-12-04 ENCOUNTER — ANESTHESIA (OUTPATIENT)
Dept: SURGERY | Facility: HOSPITAL | Age: 59
End: 2024-12-04
Payer: COMMERCIAL

## 2024-12-04 VITALS
OXYGEN SATURATION: 97 % | DIASTOLIC BLOOD PRESSURE: 71 MMHG | RESPIRATION RATE: 16 BRPM | TEMPERATURE: 98 F | HEART RATE: 68 BPM | WEIGHT: 215 LBS | HEIGHT: 62 IN | BODY MASS INDEX: 39.56 KG/M2 | SYSTOLIC BLOOD PRESSURE: 111 MMHG

## 2024-12-04 DIAGNOSIS — G89.18 POSTOPERATIVE PAIN: ICD-10-CM

## 2024-12-04 DIAGNOSIS — K80.50 BILIARY COLIC: Primary | ICD-10-CM

## 2024-12-04 DIAGNOSIS — K81.9 CHOLECYSTITIS: Primary | ICD-10-CM

## 2024-12-04 DIAGNOSIS — K81.9 CHOLECYSTITIS: ICD-10-CM

## 2024-12-04 PROBLEM — K80.00 CALCULUS OF GALLBLADDER WITH ACUTE CHOLECYSTITIS WITHOUT OBSTRUCTION: Status: ACTIVE | Noted: 2024-12-04

## 2024-12-04 PROBLEM — R73.9 HYPERGLYCEMIA: Status: ACTIVE | Noted: 2024-12-04

## 2024-12-04 PROBLEM — E87.6 HYPOKALEMIA: Status: ACTIVE | Noted: 2024-12-04

## 2024-12-04 PROBLEM — D68.51 FACTOR V LEIDEN MUTATION (HCC): Status: ACTIVE | Noted: 2024-12-04

## 2024-12-04 PROBLEM — Z86.711 HISTORY OF PULMONARY EMBOLISM: Status: ACTIVE | Noted: 2024-12-04

## 2024-12-04 PROBLEM — R79.89 AZOTEMIA: Status: ACTIVE | Noted: 2024-12-04

## 2024-12-04 PROBLEM — Z87.898: Status: ACTIVE | Noted: 2024-12-04

## 2024-12-04 LAB
ALBUMIN SERPL-MCNC: 4.7 G/DL (ref 3.2–4.8)
ALBUMIN/GLOB SERPL: 1.4 {RATIO} (ref 1–2)
ALP LIVER SERPL-CCNC: 33 U/L
ALT SERPL-CCNC: 16 U/L
ANION GAP SERPL CALC-SCNC: 16 MMOL/L (ref 0–18)
AST SERPL-CCNC: 21 U/L (ref ?–34)
ATRIAL RATE: 74 BPM
BASOPHILS # BLD AUTO: 0.03 X10(3) UL (ref 0–0.2)
BASOPHILS NFR BLD AUTO: 0.2 %
BILIRUB SERPL-MCNC: 0.4 MG/DL (ref 0.3–1.2)
BUN BLD-MCNC: 30 MG/DL (ref 9–23)
CALCIUM BLD-MCNC: 10.7 MG/DL (ref 8.7–10.4)
CHLORIDE SERPL-SCNC: 101 MMOL/L (ref 98–112)
CO2 SERPL-SCNC: 22 MMOL/L (ref 21–32)
CREAT BLD-MCNC: 1.18 MG/DL
EGFRCR SERPLBLD CKD-EPI 2021: 54 ML/MIN/1.73M2 (ref 60–?)
EOSINOPHIL # BLD AUTO: 0.06 X10(3) UL (ref 0–0.7)
EOSINOPHIL NFR BLD AUTO: 0.4 %
ERYTHROCYTE [DISTWIDTH] IN BLOOD BY AUTOMATED COUNT: 13.8 %
GLOBULIN PLAS-MCNC: 3.4 G/DL (ref 2–3.5)
GLUCOSE BLD-MCNC: 137 MG/DL (ref 70–99)
HCT VFR BLD AUTO: 42.4 %
HGB BLD-MCNC: 14.6 G/DL
IMM GRANULOCYTES # BLD AUTO: 0.05 X10(3) UL (ref 0–1)
IMM GRANULOCYTES NFR BLD: 0.4 %
LIPASE SERPL-CCNC: 57 U/L (ref 12–53)
LYMPHOCYTES # BLD AUTO: 1.36 X10(3) UL (ref 1–4)
LYMPHOCYTES NFR BLD AUTO: 9.8 %
MCH RBC QN AUTO: 28.9 PG (ref 26–34)
MCHC RBC AUTO-ENTMCNC: 34.4 G/DL (ref 31–37)
MCV RBC AUTO: 83.8 FL
MONOCYTES # BLD AUTO: 0.75 X10(3) UL (ref 0.1–1)
MONOCYTES NFR BLD AUTO: 5.4 %
NEUTROPHILS # BLD AUTO: 11.58 X10 (3) UL (ref 1.5–7.7)
NEUTROPHILS # BLD AUTO: 11.58 X10(3) UL (ref 1.5–7.7)
NEUTROPHILS NFR BLD AUTO: 83.8 %
OSMOLALITY SERPL CALC.SUM OF ELEC: 296 MOSM/KG (ref 275–295)
P AXIS: 53 DEGREES
P-R INTERVAL: 194 MS
PLATELET # BLD AUTO: 307 10(3)UL (ref 150–450)
POTASSIUM SERPL-SCNC: 3.3 MMOL/L (ref 3.5–5.1)
PROT SERPL-MCNC: 8.1 G/DL (ref 5.7–8.2)
Q-T INTERVAL: 404 MS
QRS DURATION: 74 MS
QTC CALCULATION (BEZET): 448 MS
R AXIS: -1 DEGREES
RBC # BLD AUTO: 5.06 X10(6)UL
SODIUM SERPL-SCNC: 139 MMOL/L (ref 136–145)
T AXIS: 46 DEGREES
TROPONIN I SERPL HS-MCNC: <3 NG/L
VENTRICULAR RATE: 74 BPM
WBC # BLD AUTO: 13.8 X10(3) UL (ref 4–11)

## 2024-12-04 PROCEDURE — 0FT44ZZ RESECTION OF GALLBLADDER, PERCUTANEOUS ENDOSCOPIC APPROACH: ICD-10-PCS | Performed by: SURGERY

## 2024-12-04 PROCEDURE — 80053 COMPREHEN METABOLIC PANEL: CPT | Performed by: EMERGENCY MEDICINE

## 2024-12-04 PROCEDURE — 99285 EMERGENCY DEPT VISIT HI MDM: CPT

## 2024-12-04 PROCEDURE — BF131ZZ FLUOROSCOPY OF GALLBLADDER AND BILE DUCTS USING LOW OSMOLAR CONTRAST: ICD-10-PCS | Performed by: SURGERY

## 2024-12-04 PROCEDURE — 83690 ASSAY OF LIPASE: CPT | Performed by: EMERGENCY MEDICINE

## 2024-12-04 PROCEDURE — 96375 TX/PRO/DX INJ NEW DRUG ADDON: CPT

## 2024-12-04 PROCEDURE — 88304 TISSUE EXAM BY PATHOLOGIST: CPT | Performed by: SURGERY

## 2024-12-04 PROCEDURE — 96365 THER/PROPH/DIAG IV INF INIT: CPT

## 2024-12-04 PROCEDURE — 71045 X-RAY EXAM CHEST 1 VIEW: CPT | Performed by: EMERGENCY MEDICINE

## 2024-12-04 PROCEDURE — S0028 INJECTION, FAMOTIDINE, 20 MG: HCPCS | Performed by: ANESTHESIOLOGY

## 2024-12-04 PROCEDURE — 84484 ASSAY OF TROPONIN QUANT: CPT | Performed by: EMERGENCY MEDICINE

## 2024-12-04 PROCEDURE — 96361 HYDRATE IV INFUSION ADD-ON: CPT

## 2024-12-04 PROCEDURE — 93005 ELECTROCARDIOGRAM TRACING: CPT

## 2024-12-04 PROCEDURE — 93010 ELECTROCARDIOGRAM REPORT: CPT

## 2024-12-04 PROCEDURE — 96376 TX/PRO/DX INJ SAME DRUG ADON: CPT

## 2024-12-04 PROCEDURE — 76705 ECHO EXAM OF ABDOMEN: CPT | Performed by: EMERGENCY MEDICINE

## 2024-12-04 PROCEDURE — 85025 COMPLETE CBC W/AUTO DIFF WBC: CPT | Performed by: EMERGENCY MEDICINE

## 2024-12-04 RX ORDER — HYDROMORPHONE HYDROCHLORIDE 1 MG/ML
0.6 INJECTION, SOLUTION INTRAMUSCULAR; INTRAVENOUS; SUBCUTANEOUS EVERY 5 MIN PRN
Status: DISCONTINUED | OUTPATIENT
Start: 2024-12-04 | End: 2024-12-04

## 2024-12-04 RX ORDER — HYDROCODONE BITARTRATE AND ACETAMINOPHEN 10; 325 MG/1; MG/1
1 TABLET ORAL ONCE AS NEEDED
Status: COMPLETED | OUTPATIENT
Start: 2024-12-04 | End: 2024-12-04

## 2024-12-04 RX ORDER — BISACODYL 10 MG
10 SUPPOSITORY, RECTAL RECTAL
Status: CANCELLED | OUTPATIENT
Start: 2024-12-04

## 2024-12-04 RX ORDER — MORPHINE SULFATE 2 MG/ML
1 INJECTION, SOLUTION INTRAMUSCULAR; INTRAVENOUS EVERY 2 HOUR PRN
Status: CANCELLED | OUTPATIENT
Start: 2024-12-04

## 2024-12-04 RX ORDER — PANTOPRAZOLE SODIUM 20 MG/1
20 TABLET, DELAYED RELEASE ORAL
Status: CANCELLED | OUTPATIENT
Start: 2024-12-04

## 2024-12-04 RX ORDER — SODIUM PHOSPHATE, DIBASIC AND SODIUM PHOSPHATE, MONOBASIC 7; 19 G/230ML; G/230ML
1 ENEMA RECTAL ONCE AS NEEDED
Status: CANCELLED | OUTPATIENT
Start: 2024-12-04

## 2024-12-04 RX ORDER — INDOCYANINE GREEN AND WATER 25 MG
5 KIT INJECTION ONCE
Status: DISCONTINUED | OUTPATIENT
Start: 2024-12-04 | End: 2024-12-04

## 2024-12-04 RX ORDER — KETOROLAC TROMETHAMINE 30 MG/ML
30 INJECTION, SOLUTION INTRAMUSCULAR; INTRAVENOUS EVERY 6 HOURS PRN
Status: CANCELLED | OUTPATIENT
Start: 2024-12-04 | End: 2024-12-06

## 2024-12-04 RX ORDER — SODIUM CHLORIDE 9 MG/ML
INJECTION, SOLUTION INTRAVENOUS CONTINUOUS PRN
Status: DISCONTINUED | OUTPATIENT
Start: 2024-12-04 | End: 2024-12-04 | Stop reason: SURG

## 2024-12-04 RX ORDER — SENNOSIDES 8.6 MG
17.2 TABLET ORAL NIGHTLY PRN
Status: CANCELLED | OUTPATIENT
Start: 2024-12-04

## 2024-12-04 RX ORDER — DIPHENHYDRAMINE HYDROCHLORIDE 50 MG/ML
12.5 INJECTION INTRAMUSCULAR; INTRAVENOUS AS NEEDED
Status: DISCONTINUED | OUTPATIENT
Start: 2024-12-04 | End: 2024-12-04

## 2024-12-04 RX ORDER — MEPERIDINE HYDROCHLORIDE 25 MG/ML
12.5 INJECTION INTRAMUSCULAR; INTRAVENOUS; SUBCUTANEOUS AS NEEDED
Status: DISCONTINUED | OUTPATIENT
Start: 2024-12-04 | End: 2024-12-04

## 2024-12-04 RX ORDER — MORPHINE SULFATE 4 MG/ML
4 INJECTION, SOLUTION INTRAMUSCULAR; INTRAVENOUS ONCE
Status: COMPLETED | OUTPATIENT
Start: 2024-12-04 | End: 2024-12-04

## 2024-12-04 RX ORDER — ACETAMINOPHEN 500 MG
500 TABLET ORAL EVERY 4 HOURS PRN
Status: CANCELLED | OUTPATIENT
Start: 2024-12-04

## 2024-12-04 RX ORDER — ONDANSETRON 2 MG/ML
4 INJECTION INTRAMUSCULAR; INTRAVENOUS ONCE
Status: COMPLETED | OUTPATIENT
Start: 2024-12-04 | End: 2024-12-04

## 2024-12-04 RX ORDER — MIDAZOLAM HYDROCHLORIDE 1 MG/ML
1 INJECTION INTRAMUSCULAR; INTRAVENOUS EVERY 5 MIN PRN
Status: DISCONTINUED | OUTPATIENT
Start: 2024-12-04 | End: 2024-12-04

## 2024-12-04 RX ORDER — HYDROMORPHONE HYDROCHLORIDE 1 MG/ML
0.2 INJECTION, SOLUTION INTRAMUSCULAR; INTRAVENOUS; SUBCUTANEOUS EVERY 5 MIN PRN
Status: DISCONTINUED | OUTPATIENT
Start: 2024-12-04 | End: 2024-12-04

## 2024-12-04 RX ORDER — HYDROMORPHONE HYDROCHLORIDE 1 MG/ML
0.4 INJECTION, SOLUTION INTRAMUSCULAR; INTRAVENOUS; SUBCUTANEOUS EVERY 5 MIN PRN
Status: DISCONTINUED | OUTPATIENT
Start: 2024-12-04 | End: 2024-12-04

## 2024-12-04 RX ORDER — HYDROCODONE BITARTRATE AND ACETAMINOPHEN 5; 325 MG/1; MG/1
1 TABLET ORAL EVERY 6 HOURS PRN
Qty: 15 TABLET | Refills: 0 | Status: SHIPPED | OUTPATIENT
Start: 2024-12-04

## 2024-12-04 RX ORDER — SODIUM CHLORIDE 9 MG/ML
INJECTION, SOLUTION INTRAVENOUS CONTINUOUS
Status: CANCELLED | OUTPATIENT
Start: 2024-12-04

## 2024-12-04 RX ORDER — ROCURONIUM BROMIDE 10 MG/ML
INJECTION, SOLUTION INTRAVENOUS AS NEEDED
Status: DISCONTINUED | OUTPATIENT
Start: 2024-12-04 | End: 2024-12-04 | Stop reason: SURG

## 2024-12-04 RX ORDER — HYDROCODONE BITARTRATE AND ACETAMINOPHEN 10; 325 MG/1; MG/1
2 TABLET ORAL ONCE AS NEEDED
Status: COMPLETED | OUTPATIENT
Start: 2024-12-04 | End: 2024-12-04

## 2024-12-04 RX ORDER — HEPARIN SODIUM 5000 [USP'U]/ML
5000 INJECTION, SOLUTION INTRAVENOUS; SUBCUTANEOUS EVERY 8 HOURS SCHEDULED
Status: CANCELLED | OUTPATIENT
Start: 2024-12-04

## 2024-12-04 RX ORDER — MIDAZOLAM HYDROCHLORIDE 1 MG/ML
INJECTION INTRAMUSCULAR; INTRAVENOUS AS NEEDED
Status: DISCONTINUED | OUTPATIENT
Start: 2024-12-04 | End: 2024-12-04 | Stop reason: SURG

## 2024-12-04 RX ORDER — MORPHINE SULFATE 4 MG/ML
4 INJECTION, SOLUTION INTRAMUSCULAR; INTRAVENOUS EVERY 30 MIN PRN
Status: DISCONTINUED | OUTPATIENT
Start: 2024-12-04 | End: 2024-12-04

## 2024-12-04 RX ORDER — SODIUM CHLORIDE, SODIUM LACTATE, POTASSIUM CHLORIDE, CALCIUM CHLORIDE 600; 310; 30; 20 MG/100ML; MG/100ML; MG/100ML; MG/100ML
INJECTION, SOLUTION INTRAVENOUS CONTINUOUS
Status: DISCONTINUED | OUTPATIENT
Start: 2024-12-04 | End: 2024-12-04

## 2024-12-04 RX ORDER — NALOXONE HYDROCHLORIDE 0.4 MG/ML
0.08 INJECTION, SOLUTION INTRAMUSCULAR; INTRAVENOUS; SUBCUTANEOUS AS NEEDED
Status: DISCONTINUED | OUTPATIENT
Start: 2024-12-04 | End: 2024-12-04

## 2024-12-04 RX ORDER — ACETAMINOPHEN 500 MG
1000 TABLET ORAL ONCE AS NEEDED
Status: COMPLETED | OUTPATIENT
Start: 2024-12-04 | End: 2024-12-04

## 2024-12-04 RX ORDER — MORPHINE SULFATE 2 MG/ML
2 INJECTION, SOLUTION INTRAMUSCULAR; INTRAVENOUS EVERY 2 HOUR PRN
Status: CANCELLED | OUTPATIENT
Start: 2024-12-04

## 2024-12-04 RX ORDER — KETOROLAC TROMETHAMINE 15 MG/ML
15 INJECTION, SOLUTION INTRAMUSCULAR; INTRAVENOUS EVERY 6 HOURS PRN
Status: CANCELLED | OUTPATIENT
Start: 2024-12-04 | End: 2024-12-06

## 2024-12-04 RX ORDER — BUPIVACAINE HYDROCHLORIDE AND EPINEPHRINE 5; 5 MG/ML; UG/ML
INJECTION, SOLUTION EPIDURAL; INTRACAUDAL; PERINEURAL AS NEEDED
Status: DISCONTINUED | OUTPATIENT
Start: 2024-12-04 | End: 2024-12-04

## 2024-12-04 RX ORDER — ONDANSETRON 2 MG/ML
4 INJECTION INTRAMUSCULAR; INTRAVENOUS EVERY 6 HOURS PRN
Status: DISCONTINUED | OUTPATIENT
Start: 2024-12-04 | End: 2024-12-04

## 2024-12-04 RX ORDER — POLYETHYLENE GLYCOL 3350 17 G/17G
17 POWDER, FOR SOLUTION ORAL DAILY PRN
Status: CANCELLED | OUTPATIENT
Start: 2024-12-04

## 2024-12-04 RX ORDER — PHENYLEPHRINE HCL 10 MG/ML
VIAL (ML) INJECTION AS NEEDED
Status: DISCONTINUED | OUTPATIENT
Start: 2024-12-04 | End: 2024-12-04 | Stop reason: SURG

## 2024-12-04 RX ORDER — PROCHLORPERAZINE EDISYLATE 5 MG/ML
5 INJECTION INTRAMUSCULAR; INTRAVENOUS EVERY 8 HOURS PRN
Status: CANCELLED | OUTPATIENT
Start: 2024-12-04

## 2024-12-04 RX ORDER — HEPARIN SODIUM 5000 [USP'U]/ML
5000 INJECTION, SOLUTION INTRAVENOUS; SUBCUTANEOUS ONCE
OUTPATIENT
Start: 2024-12-04 | End: 2024-12-04

## 2024-12-04 RX ORDER — KETOROLAC TROMETHAMINE 15 MG/ML
15 INJECTION, SOLUTION INTRAMUSCULAR; INTRAVENOUS ONCE
Status: COMPLETED | OUTPATIENT
Start: 2024-12-04 | End: 2024-12-04

## 2024-12-04 RX ORDER — HEPARIN SODIUM 5000 [USP'U]/ML
INJECTION, SOLUTION INTRAVENOUS; SUBCUTANEOUS AS NEEDED
Status: DISCONTINUED | OUTPATIENT
Start: 2024-12-04 | End: 2024-12-04

## 2024-12-04 RX ORDER — ONDANSETRON 2 MG/ML
4 INJECTION INTRAMUSCULAR; INTRAVENOUS EVERY 6 HOURS PRN
Status: CANCELLED | OUTPATIENT
Start: 2024-12-04

## 2024-12-04 RX ORDER — DIPHENHYDRAMINE HYDROCHLORIDE 50 MG/ML
INJECTION INTRAMUSCULAR; INTRAVENOUS AS NEEDED
Status: DISCONTINUED | OUTPATIENT
Start: 2024-12-04 | End: 2024-12-04 | Stop reason: SURG

## 2024-12-04 RX ORDER — PROCHLORPERAZINE EDISYLATE 5 MG/ML
5 INJECTION INTRAMUSCULAR; INTRAVENOUS EVERY 8 HOURS PRN
Status: DISCONTINUED | OUTPATIENT
Start: 2024-12-04 | End: 2024-12-04

## 2024-12-04 RX ORDER — FAMOTIDINE 10 MG/ML
INJECTION, SOLUTION INTRAVENOUS AS NEEDED
Status: DISCONTINUED | OUTPATIENT
Start: 2024-12-04 | End: 2024-12-04 | Stop reason: SURG

## 2024-12-04 RX ORDER — INDOCYANINE GREEN AND WATER 25 MG
KIT INJECTION AS NEEDED
Status: DISCONTINUED | OUTPATIENT
Start: 2024-12-04 | End: 2024-12-04

## 2024-12-04 RX ORDER — ENOXAPARIN SODIUM 100 MG/ML
40 INJECTION SUBCUTANEOUS DAILY
Qty: 5.6 ML | Refills: 0 | Status: SHIPPED | OUTPATIENT
Start: 2024-12-04 | End: 2024-12-04

## 2024-12-04 RX ORDER — MORPHINE SULFATE 4 MG/ML
4 INJECTION, SOLUTION INTRAMUSCULAR; INTRAVENOUS EVERY 2 HOUR PRN
Status: CANCELLED | OUTPATIENT
Start: 2024-12-04

## 2024-12-04 RX ORDER — LEVOTHYROXINE SODIUM 25 UG/1
50 TABLET ORAL
Status: CANCELLED | OUTPATIENT
Start: 2024-12-04

## 2024-12-04 RX ORDER — INDOCYANINE GREEN AND WATER 25 MG
5 KIT INJECTION ONCE
OUTPATIENT
Start: 2024-12-04 | End: 2024-12-04

## 2024-12-04 RX ORDER — AMLODIPINE BESYLATE 5 MG/1
10 TABLET ORAL DAILY
Status: CANCELLED | OUTPATIENT
Start: 2024-12-04

## 2024-12-04 RX ORDER — HYDROMORPHONE HYDROCHLORIDE 1 MG/ML
INJECTION, SOLUTION INTRAMUSCULAR; INTRAVENOUS; SUBCUTANEOUS
Status: COMPLETED
Start: 2024-12-04 | End: 2024-12-04

## 2024-12-04 RX ADMIN — DIPHENHYDRAMINE HYDROCHLORIDE 25 MG: 50 INJECTION INTRAMUSCULAR; INTRAVENOUS at 09:37:00

## 2024-12-04 RX ADMIN — SODIUM CHLORIDE: 9 INJECTION, SOLUTION INTRAVENOUS at 09:33:00

## 2024-12-04 RX ADMIN — MIDAZOLAM HYDROCHLORIDE 2 MG: 1 INJECTION INTRAMUSCULAR; INTRAVENOUS at 09:39:00

## 2024-12-04 RX ADMIN — FAMOTIDINE 20 MG: 10 INJECTION, SOLUTION INTRAVENOUS at 09:37:00

## 2024-12-04 RX ADMIN — PHENYLEPHRINE HCL 100 MCG: 10 MG/ML VIAL (ML) INJECTION at 09:49:00

## 2024-12-04 RX ADMIN — PHENYLEPHRINE HCL 200 MCG: 10 MG/ML VIAL (ML) INJECTION at 09:59:00

## 2024-12-04 RX ADMIN — SODIUM CHLORIDE: 9 INJECTION, SOLUTION INTRAVENOUS at 10:42:00

## 2024-12-04 RX ADMIN — ROCURONIUM BROMIDE 60 MG: 10 INJECTION, SOLUTION INTRAVENOUS at 09:41:00

## 2024-12-04 NOTE — DISCHARGE INSTRUCTIONS
Please start eliquis 2.5 twice a day as of 12/5/24 only take it for 2 weeks not for a month        Home Care Instructions  Cholecystectomy    MEDICATIONS  For post-operative pain control the medications are usually Norco or Tylenol #3.  These are narcotics and are best taken by starting with one tablet and repeating every four to six hours as needed.  If the patient does not feel they need the narcotics they shouldn’t take them.  If the pain is severe the patient may take up to two pills every four hours.  If a minor supplement is necessary in addition to the narcotics do not overlap with Tylenol (any product with acetaminophen) as both Norco and Tylenol #3 contain Tylenol.  Please supplement with Advil (ibuprofen) or Motrin.  Please ask your surgeon before resuming blood thinners such as aspirin, Plavix or Coumadin.  All other home medications may be resumed as scheduled.  With severe cholecystitis, antibiotics will also be prescribed.  With antibiotics, please watch for rash, facial swelling or severe diarrhea.    DIET  The patient may resume a general diet immediately.  This is not a good time to eat excessively.  The patient should eat in moderation and stick with foods the patient feels are easy to digest.  Avoid high fat foods in the immediate post-operative time period as this may still cause some problems.  The patient may eat anything in small amounts but foods rich in dairy products, fatty foods or fried foods may cause an upset stomach for up to six weeks after surgery.  There should be no alcohol consumption in the immediate recovery time period or within six hours of taking narcotics.    WOUND CARE  The top dressing may be removed the day after surgery.  This includes the gauze, tape and band-aids if they are present.  Do not remove the steri-strips or butterfly tapes that are white and adherent to the skin.  The steri-strips will eventually peel up at the ends and at this point they may be removed.   This is usually seven to ten days after surgery.  The patient may shower the day after surgery.  There is no need to cover the incisions, and all top gauze type dressing should be removed prior to showering.  Soap can get on the wounds but do not scrub over the wounds.  No hair dye or chemicals of any kind should get in the wounds.  Avoid tub baths, swimming or sitting in a hot tub for two weeks.  If visible sutures or staples are present they will be removed in the office by the surgeon or nurse.  Most wounds will be closed with dissolving suture underneath the skin.  These sutures will dissolve on their own.  If a drain is present make sure the patient receives drain care instructions from the nurse prior to discharge.  Most patients will not have a drain.    ACTIVITY  Every day the patient should be up, showered and dressed.  Each day the patient should be up and around the house.  The patient should not lie in bed and should not stay in pajamas.  We count on the patient being up, coughing, walking and deep breathing to avoid pneumonia and blood clots in the legs.  Once a day the patient should get out of the house and go shopping, go to the mall, the [employer] store, the [employer] or a restaurant.  The patient may ride in a car but should not drive the car for at least one week.  Patients should be off narcotics for at least 8 hours prior to being a .  The average time off work is 10 to 14 days; most adults will be seeing the surgeon prior to returning to work.  Students will return to school within 1-5 days after discharge from the hospital but will be off gym, sports, and indoors for recess for one month.  Patients may go up and down stairs and lift up to five pounds but no bending, pushing or pulling.  Nothing called work or exercise until the follow up visit.  No ‘stair-master’, power walking, jogging or workouts until the follow up visit.  Patients should seek further activity limits at the time of their  appointment.    APPOINTMENT  Please call our office for an appointment within five to ten days of discharge.  Any fever greater than 100.5, chills, nausea, vomiting, or severe diarrhea please call our office.  If the wound turns red, hot, swollen, becomes increasingly painful, or drains pus call us immediately at [phone].  For life threatening emergencies call 911.  For non-emergent care please call our office after 8:30 a.m. Monday through Friday.  The number listed above is our office number; our phone automatically switches to our answering service if we are not there.  Please do not use the emergency room for non-urgent care.    Thank you for entrusting us with your care.  [facility]--General Surgery

## 2024-12-04 NOTE — PROGRESS NOTES
Patient has h/p FV Leiden and DVT 2006- per EHR was on coumadin x6 mo  Caprini score 10 - warranting DVT ppx post op  Will verify with pt insurance if covers eliquis for 14 day PPX vs lovenox - SW informed.  Will discuss with pt post op - attempted to see in PACU - still in OR

## 2024-12-04 NOTE — ED PROVIDER NOTES
Patient Seen in: [facility] Emergency Department      History     Chief Complaint   Patient presents with    Abdomen/Flank Pain    Chest Pain Angina     Stated Complaint: epigastric pain, nausea/vomiting, radiating pain in chest and back pain since 0*    Subjective:   HPI      Patient is a 59-year-old female presents to ED for evaluation of epigastric pain.  Patient complains of symptoms which woke her up from sleep around 1 in the morning.  She is unable to describe the character of the pain but it radiates to her back and her chest.  Denies fever or cough.  She has had multiple episodes of vomiting.  Denies history of gallstones.  History of prior hysterectomy.  Patient states she has a history of cardiac stress test about 3 years ago that was normal.  Patient has history of hypertension, prediabetes.  She is a non-smoker.    Objective:     Past Medical History:    Abdominal hernia    Hiatal hernia    Atypical mole    Removed; not cancer    Belching    Mild    Blood in the stool    Last instance was summer 2022    Blurred vision    Diagnosed with dry eyes    Body piercing    Ears    Change in hair    Toenail fungal infection; also ridges from plaque psoriasis    Constipation    Use Citrucel daily to help    Easy bruising    Bruised with physical therapy massages    Eye disease    Vitreous degeneration; high myopia    Factor V Leiden (HCC)    GERD (gastroesophageal reflux disease)    Heart palpitations    Have had palpitations; wore monitor in 2019 but no issues found    Heartburn    Hemorrhoids    Internal and external    History of depression    Itch of skin    Allergic reaction to Terbinafine medicine    Pain with bowel movements    Occasionally when have diarrhea    Presence of other cardiac implants and grafts    Occular lenses (cataracts removed)    Pulmonary embolism (HCC)    Rash    Allergic reaction to Terbinafine medicine    Skin blushing/flushing    With exertion    Sleep apnea    Mild    Sleep  disturbance    Occasionally have trouble staying asleep    Thyroid disease    Grave’s disease in remission    Wears glasses    For reading; had Lasik and cataract surgery              Past Surgical History:   Procedure Laterality Date    Colonoscopy  Last in 2018    Have had 4    Hysterectomy      May 2006    Needle biopsy left  2010    dr hinojosa's office    Other surgical history  04/20/2022    Thyroid and Parathyroid Gland Removal    Thyroidectomy  April 20, 2023    [facility]quan - right lobe removed due to cancer; also had a parathyroid gland removed    Total abdom hysterectomy  May 24, 2006    Supracervical                Social History     Socioeconomic History    Marital status: Single   Tobacco Use    Smoking status: Never    Smokeless tobacco: Never   Vaping Use    Vaping status: Never Used   Substance and Sexual Activity    Alcohol use: Not Currently    Drug use: Never                  Physical Exam     ED Triage Vitals   BP 12/04/24 0419 (!) 161/94   Pulse 12/04/24 0419 78   Resp 12/04/24 0419 24   Temp 12/04/24 0440 97.4 °F (36.3 °C)   Temp src 12/04/24 0440 Oral   SpO2 12/04/24 0419 100 %   O2 Device 12/04/24 0419 None (Room air)       Current Vitals:   Vital Signs  BP: 131/85  Pulse: 66  Resp: (!) 3  Temp: 97.4 °F (36.3 °C)  Temp src: Oral  MAP (mmHg): 97    Oxygen Therapy  SpO2: 92 %  O2 Device: None (Room air)        Physical Exam  GENERAL: Patient appears mildly uncomfortable due to pain.  Alert and oriented X 3   HEENT: Normocephalic, atraumatic.  Moist mucous membranes.  Pupils equal round reactive to light and accommodation, extraocular motion is intact, sclerae white, conjunctiva is pink.  Oropharynx is unremarkable, no exudate.  NECK: Supple, trachea midline, no lymphadenopathy.   LUNG: Lungs clear to auscultation bilaterally, no wheezing, no rales, no rhonchi.  CARDIOVASCULAR: Regular rate and rhythm.  Normal S1S2.  No S3S4 or murmur.  ABDOMEN: Bowel sounds are present. Soft. nondistended, no pulsatile  masses.  Mild epigastric and right upper quadrant tenderness to palpation.  No rebound, guarding or rigidity.  MUSCULOSKELETAL: No calf tenderness.  Dorsalis and Posterior Tibial pulses present. No clubbing. No cyanosis.  No edema.   SKIN EXAMINATIoN: Warm and dry with normal appearance.  No rashes or lesions.  NEUROLOGICAL:  Motor strength intact all groups.  normal sensation, speech intact    ED Course     Labs Reviewed   COMP METABOLIC PANEL (14) - Abnormal; Notable for the following components:       Result Value    Glucose 137 (*)     Potassium 3.3 (*)     BUN 30 (*)     Creatinine 1.18 (*)     Calcium, Total 10.7 (*)     Calculated Osmolality 296 (*)     eGFR-Cr 54 (*)     Alkaline Phosphatase 33 (*)     All other components within normal limits   CBC WITH DIFFERENTIAL WITH PLATELET - Abnormal; Notable for the following components:    WBC 13.8 (*)     Neutrophil Absolute Prelim 11.58 (*)     Neutrophil Absolute 11.58 (*)     All other components within normal limits   LIPASE - Abnormal; Notable for the following components:    Lipase 57 (*)     All other components within normal limits   TROPONIN I HIGH SENSITIVITY - Normal   RAINBOW DRAW BLUE   RAINBOW DRAW GOLD   White blood cell count 13.8.  Lipase 57.  BUN 30.  Creatinine 1.18.  EKG    Rate, intervals and axes as noted on EKG Report.  Rate: 74  Rhythm: Sinus Rhythm  Reading: No acute changes.  Isolated T wave inversion in lead III                Limited ultrasound the abdomen read by vision rad radiology shows impacted gallstones at the neck.  Gallbladder is distended.  Mild wall thickening 3.5 mm and possible trace pericholecystic fluid at the fundus.    Medications   morphINE PF 4 MG/ML injection 4 mg (4 mg Intravenous Given 12/4/24 7398)   cefOXitin (Mefoxin) 2 g in sodium chloride 0.9% 100 mL IVPB-MBP (has no administration in time range)   ketorolac (Toradol) 15 MG/ML injection 15 mg (15 mg Intravenous Given 12/4/24 8202)   morphINE PF 4 MG/ML  injection 4 mg (4 mg Intravenous Given 12/4/24 2745)   ondansetron (Zofran) 4 MG/2ML injection 4 mg (4 mg Intravenous Given 12/4/24 0068)   sodium chloride 0.9 % IV bolus 1,000 mL (0 mL Intravenous Stopped 12/4/24 0612)            JUDIE      Patient is a 59-year-old female presents to ED for evaluation of abdominal pain, chest pain.  Differential hepatitis, pancreatitis, biliary colic, cholecystitis, ACS.  Patient underwent laboratory testing.  Her troponin is normal.  EKG is normal.  Slight elevation in creatinine consistent with mild dehydration.  Elevation of white blood cell count of 13.8.  Mild elevation of lipase.  Abdominal ultrasound shows impacted gallstones at the neck with distended gallbladder with mild wall thickening.  Suspect early cholecystitis.  Patient given pain medication, Toradol, narcotics.  IV Mefoxin given.  Recommend admission to hospital for cholecystectomy as still having pain 3/10 on pain scale.  Case discussed with hospitalist as well as general surgery, Dr. Herrera and patient will be admitted for cholecystectomy.  Workup and results were discussed with patient. Patient has no other questions, complaints or concerns. Patient will be admitted to the hospital for further workup.    Admission disposition: 12/4/2024  6:14 AM           Medical Decision Making      Disposition and Plan     Clinical Impression:  1. Biliary colic    2.  Renal insufficiency  3.  Elevated lipase    Disposition:  Admit  12/4/2024  6:14 am    Follow-up:  No follow-up provider specified.        Medications Prescribed:  Current Discharge Medication List              Supplementary Documentation:         Hospital Problems       Present on Admission  Date Reviewed: 7/12/2024            ICD-10-CM Noted POA    * (Principal) Biliary colic K80.50 12/4/2024 Unknown    Azotemia R79.89 12/4/2024 Yes    Hyperglycemia R73.9 12/4/2024 Yes    Hypokalemia E87.6 12/4/2024 Yes

## 2024-12-04 NOTE — ANESTHESIA PREPROCEDURE EVALUATION
PRE-OP EVALUATION    Patient Name: Usha Last    Admit Diagnosis: No admission diagnoses are documented for this encounter.    Pre-op Diagnosis: Cholecystitis [K81.9]    LAPAROSCOPIC CHOLECYSTECTOMY WITH INDOCYANINE GREEN, POSSIBLE OPEN    Anesthesia Procedure: LAPAROSCOPIC CHOLECYSTECTOMY WITH INDOCYANINE GREEN, POSSIBLE OPEN    Surgeons and Role:     * Latanya Fernandez MD - Primary    Pre-op vitals reviewed.  Temp: 97.4 °F (36.3 °C)  Pulse: 70  Resp: 16  BP: 121/79  SpO2: 91 %  Body mass index is 39.32 kg/m².    Current medications reviewed.  Hospital Medications:  • [COMPLETED] ketorolac (Toradol) 15 MG/ML injection 15 mg  15 mg Intravenous Once   • [COMPLETED] morphINE PF 4 MG/ML injection 4 mg  4 mg Intravenous Once   • [COMPLETED] ondansetron (Zofran) 4 MG/2ML injection 4 mg  4 mg Intravenous Once   • morphINE PF 4 MG/ML injection 4 mg  4 mg Intravenous Q30 Min PRN   • [COMPLETED] sodium chloride 0.9 % IV bolus 1,000 mL  1,000 mL Intravenous Once   • [COMPLETED] cefOXitin (Mefoxin) 2 g in sodium chloride 0.9% 100 mL IVPB-MBP  2 g Intravenous Once   • [COMPLETED] ondansetron (Zofran) 4 MG/2ML injection 4 mg  4 mg Intravenous Once   • indocyanine green (IC-Green) injection 5 mg  5 mg Intravenous Once       Outpatient Medications:   Prescriptions Prior to Admission[1]    Allergies: Cat hair extract, Terbinafine, Erythromycin, Amoxicillin-pot clavulanate, and Nitrofurantoin      Anesthesia Evaluation    Patient summary reviewed.    Anesthetic Complications  (-) history of anesthetic complications         GI/Hepatic/Renal      (+) GERD                           Cardiovascular      ECG reviewed.            (+) hypertension                                     Endo/Other    Negative endo/other ROS.                              Pulmonary                    (+) sleep apnea and no treatment      Neuro/Psych    Negative neuro/psych ROS.                              Past Surgical History:   Procedure Laterality  Date   • Colonoscopy  Last in 2018    Have had 4   • Hysterectomy      May 2006   • Needle biopsy left  2010    dr hinojosa's office   • Other surgical history  04/20/2022    Thyroid and Parathyroid Gland Removal   • Thyroidectomy  April 20, 2023    Daquan - right lobe removed due to cancer; also had a parathyroid gland removed   • Total abdom hysterectomy  May 24, 2006    Supracervical     Social History     Socioeconomic History   • Marital status: Single   Tobacco Use   • Smoking status: Never   • Smokeless tobacco: Never   Vaping Use   • Vaping status: Never Used   Substance and Sexual Activity   • Alcohol use: Not Currently   • Drug use: Never     History   Drug Use Unknown     Available pre-op labs reviewed.  Lab Results   Component Value Date    WBC 13.8 (H) 12/04/2024    RBC 5.06 12/04/2024    HGB 14.6 12/04/2024    HCT 42.4 12/04/2024    MCV 83.8 12/04/2024    MCH 28.9 12/04/2024    MCHC 34.4 12/04/2024    RDW 13.8 12/04/2024    .0 12/04/2024     Lab Results   Component Value Date     12/04/2024    K 3.3 (L) 12/04/2024     12/04/2024    CO2 22.0 12/04/2024    BUN 30 (H) 12/04/2024    CREATSERUM 1.18 (H) 12/04/2024     (H) 12/04/2024    CA 10.7 (H) 12/04/2024            Airway      Mallampati: III  Mouth opening: <3 FB  TM distance: 4 - 6 cm  Neck ROM: limited Cardiovascular    Cardiovascular exam normal.         Dental    Dentition appears grossly intact         Pulmonary    Pulmonary exam normal.                 Other findings        ASA: 3   Plan: general  NPO status verified and patient meets guidelines.  Patient has not taken beta blockers in last 24 hours.      Comment: GA with ETT. Risks discussed including sore throat, hoarse voice, dental trauma, nausea/vomiting  Plan/risks discussed with: patient            Present on Admission:  • Hypokalemia  • Hyperglycemia  • Azotemia             [1] (Not in a hospital admission)

## 2024-12-04 NOTE — ANESTHESIA POSTPROCEDURE EVALUATION
[facility]    Usha Last Patient Status:  Emergency   Age/Gender 59 year old female MRN [id number]   Location [facility] EMERGENCY DEPARTMENT Attending Addis Lewis MD   Hosp Day # 0 PCP Braulio Hoang MD       Anesthesia Post-op Note    LAPAROSCOPIC CHOLECYSTECTOMY WITH INDOCYANINE GREEN, POSSIBLE OPEN    Procedure Summary       Date: 12/04/24 Room / Location:  MAIN OR 03 / [facility] MAIN OR    Anesthesia Start: 0933 Anesthesia Stop:     Procedure: LAPAROSCOPIC CHOLECYSTECTOMY WITH INDOCYANINE GREEN, POSSIBLE OPEN (Abdomen) Diagnosis:       Cholecystitis      (acute Cholecystitis [K81.9] cholelitiathiasis)    Surgeons: Latanya Fernandez MD Anesthesiologist: Mathew Perez MD    Anesthesia Type: general ASA Status: 3            Anesthesia Type: general    Vitals Value Taken Time    12/04/24 1057   Temp 98.1 12/04/24 1057   Pulse 90 12/04/24 1057   Resp 20 12/04/24 1057   SpO2 96 12/04/24 1057       Patient Location: PACU    Anesthesia Type: general    Airway Patency: patent and extubated    Postop Pain Control: adequate    Mental Status: mildly sedated but able to meaningfully participate in the post-anesthesia evaluation    Nausea/Vomiting: none    Cardiopulmonary/Hydration status: stable euvolemic    Complications: no apparent anesthesia related complications    Postop vital signs: stable    Dental Exam: Unchanged from Preop    Patient to be discharged from PACU when criteria met.

## 2024-12-04 NOTE — ANESTHESIA PROCEDURE NOTES
Airway  Date/Time: 12/4/2024 9:42 AM  Urgency: elective      General Information and Staff    Patient location during procedure: OR  Anesthesiologist: Mathew Perez MD  Performed: anesthesiologist   Performed by: Mathew Perez MD  Authorized by: Mathew Perez MD      Indications and Patient Condition  Indications for airway management: anesthesia  Sedation level: deep  Preoxygenated: yes  Patient position: sniffing  Mask difficulty assessment: 1 - vent by mask    Final Airway Details  Final airway type: endotracheal airway      Successful airway: ETT  Cuffed: yes   Successful intubation technique: Video laryngoscopy  Facilitating devices/methods: intubating stylet  Endotracheal tube insertion site: oral  Blade: GlideScope  Blade size: #3  ETT size (mm): 7.5    Cormack-Lehane Classification: grade IIA - partial view of glottis  Placement verified by: capnometry   Measured from: lips  ETT to lips (cm): 21  Number of attempts at approach: 1  Number of other approaches attempted: 0

## 2024-12-04 NOTE — ED INITIAL ASSESSMENT (HPI)
Patient reports having what she states felt like gas pain in her epigastric area starting around 1am today but that it progressively worsened and is now raditating into her chest and back. Hx PE in the past Factor 5 Leiden but states it does not feel similar.

## 2024-12-04 NOTE — H&P
[facility]  History & Physical    Usha Last Patient Status:  Emergency    4/10/1965 MRN [id number]   Location [facility] EMERGENCY DEPARTMENT Attending Addis Lewis MD   Hosp Day # 0 PCP Braulio Hoang MD   Consulting Provider:   Dr. Gonzales    Chief Complaint:  Abdominal pain    History of Present Illness:  Usha Last is a a(n) 59 year old female who presents to [facility] on 2024 with complaints of  abdominal pain waking her up from sleep around 1:00 AM.     The patient states the pain is primarily in the epigastric region radiating to the chest and back. She did have associated nausea and vomiting. She denies fevers, chills, diarrhea or constipation.    Upon presentation to the emergency department, abdominal ultrasound revealed cholelithiasis with suggestion of an impacted stone in the gallbladder neck. No gallbladder wall thickening or biliary ductal dilation.     Vital signs are stable. Leukocytosis (13.8) with left shift. Hemoglobin 14.6. Platelets 307,000. Hypokalemic (3.3) and hypercalcemic (10.7). Electrolytes are otherwise unremarkable. BUN and creatinine elevated at 30 and 1.18 respectively. LFTs and total bilirubin are unremarkable.     Past abdominal surgical history includes a hysterectomy in May of 2006. Following this operation, she had a PE following this operation requiring coumadin for 6 months. Workup revealed Factor V Leiden mutation. Her Caprini VTE score puts her at an 11% risk of VTE within 30 days of her operation.     She also has a past medical history significant for thyroid cancer s/p thyroidectomy, GERD, VANDANA, type 2 diabetes mellitus and hypertension.       History:  Past Medical History:    Abdominal hernia    Hiatal hernia    Atypical mole    Removed; not cancer    Belching    Mild    Blood in the stool    Last instance was summer 2022    Blurred vision    Diagnosed with dry eyes    Body piercing    Ears    Change in hair    Toenail  fungal infection; also ridges from plaque psoriasis    Constipation    Use Citrucel daily to help    Easy bruising    Bruised with physical therapy massages    Eye disease    Vitreous degeneration; high myopia    Factor V Leiden (HCC)    GERD (gastroesophageal reflux disease)    Heart palpitations    Have had palpitations; wore monitor in 2019 but no issues found    Heartburn    Hemorrhoids    Internal and external    History of depression    Itch of skin    Allergic reaction to Terbinafine medicine    Pain with bowel movements    Occasionally when have diarrhea    Presence of other cardiac implants and grafts    Occular lenses (cataracts removed)    Pulmonary embolism (HCC)    Rash    Allergic reaction to Terbinafine medicine    Skin blushing/flushing    With exertion    Sleep apnea    Mild    Sleep disturbance    Occasionally have trouble staying asleep    Thyroid disease    Grave’s disease in remission    Wears glasses    For reading; had Lasik and cataract surgery     Past Surgical History:   Procedure Laterality Date    Colonoscopy  Last in 2018    Have had 4    Hysterectomy      May 2006    Needle biopsy left  2010    dr hinojosa's office    Other surgical history  2022    Thyroid and Parathyroid Gland Removal    Thyroidectomy  2023    [facility] - right lobe removed due to cancer; also had a parathyroid gland removed    Total abdom hysterectomy  May 24, 2006    Supracervical     Family History   Problem Relation Age of Onset    Hypertension Mother             Heart Attack Mother             Colon Polyps Father     Stroke Maternal Grandfather             Breast Cancer Maternal Grandmother             Diabetes Paternal Grandfather             Colon Polyps Brother       reports that she has never smoked. She has never used smokeless tobacco. She reports that she does not currently use alcohol. She reports that she does not use  drugs.    Allergies:  Allergies[1]    Medications:  (Not in a hospital admission)        Current Facility-Administered Medications:     morphINE PF 4 MG/ML injection 4 mg, 4 mg, Intravenous, Q30 Min PRN    indocyanine green (IC-Green) injection 5 mg, 5 mg, Intravenous, Once    Review of Systems:  Review of Systems   Constitutional:  Negative for chills and fever.   Cardiovascular:  Positive for chest pain.   Gastrointestinal:  Positive for abdominal pain, nausea and vomiting. Negative for constipation and diarrhea.   All other systems reviewed and are negative.       Physical Exam:  /79   Pulse 70   Temp 97.4 °F (36.3 °C) (Oral)   Resp 16   Ht 62\"   Wt 215 lb (97.5 kg)   SpO2 91%   BMI 39.32 kg/m²   Physical Exam  Vitals and nursing note reviewed.   Constitutional:       Appearance: Normal appearance. She is obese.   HENT:      Head: Normocephalic and atraumatic.      Right Ear: External ear normal.      Left Ear: External ear normal.      Nose: Nose normal.   Eyes:      General: No scleral icterus.     Conjunctiva/sclera: Conjunctivae normal.   Pulmonary:      Effort: Pulmonary effort is normal. No respiratory distress.   Abdominal:      General: There is no distension.      Palpations: Abdomen is soft. There is no mass.      Tenderness: There is abdominal tenderness in the right upper quadrant and epigastric area.      Hernia: No hernia is present.   Neurological:      Mental Status: She is alert.   Psychiatric:         Mood and Affect: Mood normal.         Behavior: Behavior normal.         Judgment: Judgment normal.            Laboratory Data:  Recent Labs   Lab 12/04/24 0424   RBC 5.06   HGB 14.6   HCT 42.4   MCV 83.8   MCH 28.9   MCHC 34.4   RDW 13.8   NEPRELIM 11.58*   WBC 13.8*   .0       Recent Labs   Lab 12/04/24 0424   *   BUN 30*   CREATSERUM 1.18*   CA 10.7*   ALB 4.7      K 3.3*      CO2 22.0   ALKPHO 33*   AST 21   ALT 16   BILT 0.4   TP 8.1         No results  for input(s): \"PTP\", \"INR\", \"PTT\" in the last 168 hours.      XR CHEST AP PORTABLE  (CPT=71045)    Result Date: 12/4/2024  CONCLUSION:  See above.  A preliminary report was provided by [facility].    LOCATION:  [facility]      Dictated by (CST): Blaine Broderick MD on 12/04/2024 at 8:21 AM     Finalized by (CST): Blaine Broderick MD on 12/04/2024 at 8:21 AM       US GALLBLADDER (CPT=76705)    Result Date: 12/4/2024  CONCLUSION:  1. Cholelithiasis, with suggestion of an impacted stone in the gallbladder neck. 2. No gallbladder wall thickening or biliary ductal dilatation.   LOCATION:  [facility]    Dictated by (CST): Milan Floyd DO on 12/04/2024 at 6:28 AM     Finalized by (CST): Milan Floyd DO on 12/04/2024 at 6:29 AM        Kasey Cline PA-C  12/4/2024  8:41 AM    Is this a shared or split note between Advanced Practice Provider and Physician? Yes      Impression:  Patient Active Problem List   Diagnosis    Vitreous degeneration    Punctate keratitis    Degenerative progressive high myopia    Bilateral posterior capsular opacification    Primary hypertension    Factor 5 Leiden mutation, heterozygous (HCC)    Hypothyroidism, adult    Gastroesophageal reflux disease    History of thyroid cancer    VANDANA (obstructive sleep apnea)    History of adenomatous polyp of colon    Morbid (severe) obesity due to excess calories (HCC)    Hypokalemia    Hyperglycemia    Azotemia    Biliary colic       59-year-old female who presents to the emergency department with complaints of abdominal pain in the epigastrium radiating to the right upper quadrant.  Workup in the emergency department revealed leukocytosis 13.8, elevated BUN and creatinine, LFTs within normal limits.  Ultrasound of the abdomen revealed cholelithiasis with a stone impacted in the neck of the gallbladder.  There was no evidence of choledocholithiasis.    Of note, the patient does have a past medical history of hysterectomy in 2006 after which she developed a  postoperative pulmonary embolism.  The patient was worked up by hematology and was found to have factor V Leiden heterozygous mutation.  The patient was initially anticoagulated on heparin and she did develop a large hematoma of the anterior abdominal wall causing severe anemia and a 5-day stay in the ICU.  The patient did have a IVC filter placed at that time which was subsequently removed and she was on Coumadin and Lovenox for approximately 6 months.  Lovenox was discontinued once she was therapeutic on Coumadin.  Usha has since had a thyroidectomy for thyroid cancer without any VTE event.    This morning, Usha does have persistent epigastric and right upper quadrant abdominal pain.  On physical examination she remains tender in the right upper quadrant with mild voluntary guarding.  There is no evidence of involuntary guarding, percussion tenderness or peritonitis.    Treatment options were reviewed in detail with Usha. at this time due to ongoing symptoms, she has decided to proceed with laparoscopic cholecystectomy with intraoperative cholangiogram for symptomatic cholelithiasis and acute cholecystitis.      The patient did have her Caprini VTE score calculated-history of PE, factor V Leiden mutation, BMI of 39.3, age over 40, laparoscopic surgery over 45 minutes places her into the high risk category with a 10 to 11% risk of VTE within 30 days of surgery.  This was discussed in detail with Usha. We did discuss preoperative and intraoperative prophylaxis with heparin and mechanical compression device.  Postoperative VTE prophylactic measures were also discussed with Dr. Lewis.  Subcu Lovenox, Eliquis and 81 mg aspirin for prophylaxis was reviewed with Usha.  Usha has decided to proceed with postoperative Eliquis for continued DVT prophylaxis per recommendations from Dr. Debbie Fernandez MD, FACS    Please note that this report has been produced using speech recognition software and may contain errors related  to that system including but not limited to errors in grammar, punctuation and spelling as well as words and phrases that possibly may have been recognized inappropriately.  If there are any questions or concerns please contact the dictating provider for clarification.  The 21st Century Cures Act makes medical notes like these available to patients in the interest of trans parency. Please be advised this is a medical document. Medical documents are intended to carry relevant information, facts as evident, and the clinical opinion of the practitioner. The medical note is intended as peer to peer communication and may appear blunt or direct. It is written in medical language and may contain abbreviations or verbiage that are unfamiliar.  If there are any questions or concerns please contact the dictating provider for clarification.           [1]   Allergies  Allergen Reactions    Cat Hair Extract SWELLING     Causes throat itching, coughing and sneezing    Terbinafine HIVES, RASH, ITCHING and SWELLING    Erythromycin PAIN and NAUSEA AND VOMITING     Stomach pain    Amoxicillin-Pot Clavulanate RASH    Nitrofurantoin RASH

## 2024-12-04 NOTE — ED QUICK NOTES
Patient oxygen saturation dropping into 80's while asleep. Patient reports she has been diagnosed with mild sleep apnea in the past. 2L supplemental oxygen applied.

## 2024-12-04 NOTE — OPERATIVE REPORT
[facility]   Operative Note    Usha Last Location: OR   [facility] [id number] MRN [id number]   Admission Date 12/4/2024 Operation Date 12/4/2024   Attending Physician No att. providers found Operating Physician Latanya Fernandez MD     Date of procedure:    December 4, 2024    Pre-Operative Diagnosis: acute Cholecystitis [K81.9] cholelitiathiasis    Indication for Surgery: Symptomatic cholelithiasis, biliary colic    Post-Operative Diagnosis: Same as above    Procedure performed:  Laparoscopic cholecystectomy with ICG cholangiogram    Surgeons and Role:     * Latanya Fernandez MD - Primary    Assistant:   DIMITRIS Gunn    Anesthesia: General    History of Present Illness:    59-year-old female who presents to the emergency department with complaints of abdominal pain in the epigastrium radiating to the right upper quadrant.  Workup in the emergency department revealed leukocytosis 13.8, elevated BUN and creatinine, LFTs within normal limits.  Ultrasound of the abdomen revealed cholelithiasis with a stone impacted in the neck of the gallbladder.  There was no evidence of choledocholithiasis.    Of note, the patient does have a past medical history of hysterectomy in 2006 after which she developed a postoperative pulmonary embolism.  The patient was worked up by hematology and was found to have factor V Leiden heterozygous mutation.  The patient was initially anticoagulated on heparin and she did develop a large hematoma of the anterior abdominal wall causing severe anemia and a 5-day stay in the ICU.  The patient did have a IVC filter placed at that time which was subsequently removed and she was on Coumadin and Lovenox for approximately 6 months.  Lovenox was discontinued once she was therapeutic on Coumadin.  Usha has since had a thyroidectomy for thyroid cancer without any VTE event.    This morning, Usha does have persistent epigastric and right upper quadrant abdominal pain.  On physical examination she  remains tender in the right upper quadrant with mild voluntary guarding.  There is no evidence of involuntary guarding, percussion tenderness or peritonitis.    Treatment options were reviewed in detail with Usha. at this time due to ongoing symptoms, she has decided to proceed with laparoscopic cholecystectomy with intraoperative cholangiogram for symptomatic cholelithiasis and acute cholecystitis.      The patient did have her Caprini VTE score calculated-history of PE, factor V Leiden mutation, BMI of 39.3, age over 40, laparoscopic surgery over 45 minutes places her into the high risk category with a 10 to 11% risk of VTE within 30 days of surgery.  This was discussed in detail with Usha. We did discuss preoperative and intraoperative prophylaxis with heparin and mechanical compression device.  Postoperative VTE prophylactic measures were also discussed with Dr. Lewis.  Subcu Lovenox, Eliquis and 81 mg aspirin for prophylaxis was reviewed with Usha.  Usha has decided to proceed with postoperative Eliquis for continued DVT prophylaxis per recommendations from Dr. Lewis      Discussed with patient:  The potential risks and benefits of the procedure were discussed in detail with the patient including but not limited to bleeding, infection, seroma/hematoma formation, postoperative wound complications including development of a hernia, trocar injury, intra-abdominal organ injury, bile leakage, biloma formation, common bile duct injury, conversion to an open procedure, possible need for a drain, possible recurrence or persistence of symptoms despite surgery,  postoperative diarrhea, possible need for further treatment/intervention pending intra-operative/IOC findings etc, pneumonia, postoperative thromboembolic event including DVT and PE. These and other potential intra-operative and post-operative risks were reviewed with the patient and they do not have any questions and does wish to proceed with surgery  today.    Note:   A surgical assistant was essential to the proper conduct of this case particularly the aspect of dissection necessary in and around the hilum of the gallbladder, identification of critical structures such as the cystic duct, common bile duct, cystic artery and cystic plate.    Summary of Procedure:   The patient was taken to the operating room and was placed on the OR table in a supine position. After the induction of general anesthesia, perioperative antibiotics were given. The patient was then prepped and draped in usual sterile fashion. Using local anesthesia a taryn-umbilical incision was made and the anterior abdominal fascia was elevated with a Kocker forcep. The Veress needle was introduced into the abdomen and the abdomen was insufflated to 15 mm mercury. The Veress needle was then exchanged for a 11 mm port.  The camera was introduced to the 11 mm port.  A 5 mm epigastric port and two 5 mm lateral ports were placed under direct vision without incidence.   The patient was placed into a reverse Trendelenburg position with the right side up.   Initial evaluation of the right upper quadrant revealed the gallbladder wall to be edematous consistent with acute cholecystitis.  The gallbladder was then identified and this was grasped at the fundus, the Graham's pouch was identified and this was retracted laterally to expose the triangle of Calot.  ICG fluorescent imaging was then performed to confirm anatomy during dissection in the cystic duct-gallbladder junction.  Dissection was performed to define the cystic duct for sufficient length.  Dissection was kept above the line of Rouviere and a critical view was obtained.  ICG fluorescent imaging was again performed to confirm anatomy prior to transection of the cystic duct.  The cystic duct was then clipped once proximally and 3 times distally. The cystic duct was then divided. The cystic artery was identified and seen  to ascend onto the  gallbladder wall.  This was also defined for a sufficient length and was clipped twice proximally and once distally and divided. Electrocautery was then used to dissect the gallbladder away from the liver bed. Having completed this maneuver the gallbladder was placed into a Endopouch and was withdrawn through the umbilical port site. The right upper quadrant was copiously irrigated with antibiotic irrigation until the irrigant was clear. The clips across the cystic duct and the cystic artery were examined and found to be intact. There was no evidence of bleeding or bile leakage from the liver bed. The remainder of the irrigation fluid, which was clear, was aspirated.  The accessory ports were removed under direct vision and there was no evidence of bleeding from the anterior abdominal wall. The abdomen was then deflated. The umbilical port was closed with 0 Vicryl.   All skin incisions were closed with 4-0 Vicryl in a subcuticular manner.  All sponge, instrument and needle counts were correct at the conclusion of the procedure. The patient did tolerate the procedure well.  The patient was taken to the recovery room in stable condition.      Complications:  None    EBL:    Minimal    Pathologic specimens: The gallbladder and its contents    KIMBER Fernandez MD, FACS      Please note that this report has been produced using speech recognition software and may contain errors related to that system including but not limited to errors in grammar, punctuation and spelling as well as words and phrases that possibly may have been recognized inappropriately.  If there are any questions or concerns please contact the dictating provider for clarification.  The 21st Century Cures Act makes medical notes like these available to patients in the interest of trans parency. Please be advised this is a medical document. Medical documents are intended to carry relevant information, facts as evident, and the clinical opinion of the  practitioner. The medical note is intended as peer to peer communication and may appear blunt or direct. It is written in medical language and may contain abbreviations or verbiage that are unfamiliar.  If there are any questions or concerns please contact the dictating provider for clarification.

## 2024-12-16 ENCOUNTER — LAB ENCOUNTER (OUTPATIENT)
Dept: LAB | Age: 59
End: 2024-12-16
Attending: INTERNAL MEDICINE
Payer: COMMERCIAL

## 2024-12-16 DIAGNOSIS — Z00.00 ROUTINE GENERAL MEDICAL EXAMINATION AT A HEALTH CARE FACILITY: ICD-10-CM

## 2024-12-16 PROBLEM — K80.00 CALCULUS OF GALLBLADDER WITH ACUTE CHOLECYSTITIS WITHOUT OBSTRUCTION: Status: RESOLVED | Noted: 2024-12-04 | Resolved: 2024-12-16

## 2024-12-16 PROBLEM — H16.149 PUNCTATE KERATITIS: Status: RESOLVED | Noted: 2022-12-22 | Resolved: 2024-12-16

## 2024-12-16 PROBLEM — R79.89 AZOTEMIA: Status: RESOLVED | Noted: 2024-12-04 | Resolved: 2024-12-16

## 2024-12-16 PROBLEM — R73.9 HYPERGLYCEMIA: Status: RESOLVED | Noted: 2024-12-04 | Resolved: 2024-12-16

## 2024-12-16 PROBLEM — K80.50 BILIARY COLIC: Status: RESOLVED | Noted: 2024-12-04 | Resolved: 2024-12-16

## 2024-12-16 PROBLEM — E87.6 HYPOKALEMIA: Status: RESOLVED | Noted: 2024-12-04 | Resolved: 2024-12-16

## 2024-12-16 LAB
ALBUMIN SERPL-MCNC: 4.4 G/DL (ref 3.2–4.8)
ALBUMIN/GLOB SERPL: 1.4 {RATIO} (ref 1–2)
ALP LIVER SERPL-CCNC: 24 U/L
ALT SERPL-CCNC: 13 U/L
ANION GAP SERPL CALC-SCNC: 6 MMOL/L (ref 0–18)
AST SERPL-CCNC: 22 U/L (ref ?–34)
BASOPHILS # BLD AUTO: 0.07 X10(3) UL (ref 0–0.2)
BASOPHILS NFR BLD AUTO: 1.2 %
BILIRUB SERPL-MCNC: 0.5 MG/DL (ref 0.3–1.2)
BUN BLD-MCNC: 11 MG/DL (ref 9–23)
CALCIUM BLD-MCNC: 9.8 MG/DL (ref 8.7–10.4)
CHLORIDE SERPL-SCNC: 106 MMOL/L (ref 98–112)
CHOLEST SERPL-MCNC: 138 MG/DL (ref ?–200)
CO2 SERPL-SCNC: 28 MMOL/L (ref 21–32)
CREAT BLD-MCNC: 1.04 MG/DL
EGFRCR SERPLBLD CKD-EPI 2021: 63 ML/MIN/1.73M2 (ref 60–?)
EOSINOPHIL # BLD AUTO: 0.26 X10(3) UL (ref 0–0.7)
EOSINOPHIL NFR BLD AUTO: 4.4 %
ERYTHROCYTE [DISTWIDTH] IN BLOOD BY AUTOMATED COUNT: 13.7 %
FASTING PATIENT LIPID ANSWER: YES
FASTING STATUS PATIENT QL REPORTED: YES
GLOBULIN PLAS-MCNC: 3.1 G/DL (ref 2–3.5)
GLUCOSE BLD-MCNC: 86 MG/DL (ref 70–99)
HCT VFR BLD AUTO: 43.9 %
HDLC SERPL-MCNC: 48 MG/DL (ref 40–59)
HGB BLD-MCNC: 14.3 G/DL
IMM GRANULOCYTES # BLD AUTO: 0.01 X10(3) UL (ref 0–1)
IMM GRANULOCYTES NFR BLD: 0.2 %
LDLC SERPL CALC-MCNC: 78 MG/DL (ref ?–100)
LYMPHOCYTES # BLD AUTO: 1.81 X10(3) UL (ref 1–4)
LYMPHOCYTES NFR BLD AUTO: 30.5 %
MCH RBC QN AUTO: 29.2 PG (ref 26–34)
MCHC RBC AUTO-ENTMCNC: 32.6 G/DL (ref 31–37)
MCV RBC AUTO: 89.8 FL
MONOCYTES # BLD AUTO: 0.49 X10(3) UL (ref 0.1–1)
MONOCYTES NFR BLD AUTO: 8.3 %
NEUTROPHILS # BLD AUTO: 3.29 X10 (3) UL (ref 1.5–7.7)
NEUTROPHILS # BLD AUTO: 3.29 X10(3) UL (ref 1.5–7.7)
NEUTROPHILS NFR BLD AUTO: 55.4 %
NONHDLC SERPL-MCNC: 90 MG/DL (ref ?–130)
OSMOLALITY SERPL CALC.SUM OF ELEC: 289 MOSM/KG (ref 275–295)
PLATELET # BLD AUTO: 298 10(3)UL (ref 150–450)
POTASSIUM SERPL-SCNC: 4.1 MMOL/L (ref 3.5–5.1)
PROT SERPL-MCNC: 7.5 G/DL (ref 5.7–8.2)
RBC # BLD AUTO: 4.89 X10(6)UL
SODIUM SERPL-SCNC: 140 MMOL/L (ref 136–145)
TRIGL SERPL-MCNC: 55 MG/DL (ref 30–149)
TSI SER-ACNC: 1.74 UIU/ML (ref 0.55–4.78)
VLDLC SERPL CALC-MCNC: 9 MG/DL (ref 0–30)
WBC # BLD AUTO: 5.9 X10(3) UL (ref 4–11)

## 2024-12-16 PROCEDURE — 80061 LIPID PANEL: CPT | Performed by: INTERNAL MEDICINE

## 2024-12-16 PROCEDURE — 80050 GENERAL HEALTH PANEL: CPT | Performed by: INTERNAL MEDICINE

## 2024-12-16 NOTE — PROGRESS NOTES
Subjective:   Patient ID: Usha Last is a 59 year old female.    HPI  Usha Last is a 59 year old female.     HPI:   Patient presents for recheck of her hypertension. Pt has been taking medications as instructed, no medication side effects, home BP monitoring in the range of 130's systolic and 70's diastolic.    Wt Readings from Last 6 Encounters:   12/17/24 214 lb (97.1 kg)   12/04/24 215 lb (97.5 kg)   07/12/24 231 lb (104.8 kg)   06/24/24 231 lb (104.8 kg)   02/15/24 224 lb (101.6 kg)   01/30/24 223 lb (101.2 kg)     Body mass index is 39.14 kg/m².    Lab Results   Component Value Date    CHOLEST 138 12/16/2024    CHOLEST 180 06/01/2023     Lab Results   Component Value Date    HDL 48 12/16/2024    HDL 66 (H) 06/01/2023     Lab Results   Component Value Date    TRIG 55 12/16/2024    TRIG 53 06/01/2023     Lab Results   Component Value Date    LDL 78 12/16/2024     (H) 06/01/2023     Lab Results   Component Value Date    AST 22 12/16/2024    AST 21 12/04/2024    AST 19 06/01/2023     Lab Results   Component Value Date    ALT 13 12/16/2024    ALT 16 12/04/2024    ALT 22 06/01/2023     Lab Results   Component Value Date    GLU 86 12/16/2024     (H) 12/04/2024    GLU 96 01/29/2024       Current Outpatient Medications   Medication Sig Dispense Refill    amLODIPine 10 MG Oral Tab Take 1 tablet (10 mg total) by mouth daily. 90 tablet 1    omeprazole 20 MG Oral Capsule Delayed Release Take 1 capsule (20 mg total) by mouth before breakfast. 90 capsule 1    OASIS TEARS PF OP Place 1 drop into both eyes 3 (three) times daily as needed (dry eyes).      apixaban (ELIQUIS) 2.5 MG Oral Tab Take 1 tablet (2.5 mg total) by mouth 2 (two) times daily. Take 14 days only then stop 60 tablet 0    Blood Glucose Monitoring Suppl (ONETOUCH VERIO) w/Device Does not apply Kit 1 each daily. 1 kit 0    Glucose Blood (ONETOUCH VERIO) In Vitro Strip Test 2x daily 200 strip 0    OneTouch Delica Lancets 33G  Does not apply Misc Test 2 x daily 200 each 0    levothyroxine 50 MCG Oral Tab Take 1 tablet (50 mcg total) by mouth before breakfast. 90 tablet 3      Past Medical History:    Abdominal hernia    Hiatal hernia    Atypical mole    Removed; not cancer    Belching    Mild    Blood in the stool    Last instance was summer 2022    Blurred vision    Diagnosed with dry eyes    Body piercing    Ears    Change in hair    Toenail fungal infection; also ridges from plaque psoriasis    Constipation    Use Citrucel daily to help    Easy bruising    Bruised with physical therapy massages    Eye disease    Vitreous degeneration; high myopia    Factor V Leiden (HCC)    GERD (gastroesophageal reflux disease)    Heart palpitations    Have had palpitations; wore monitor in 2019 but no issues found    Heartburn    Hemorrhoids    Internal and external    History of depression    Itch of skin    Allergic reaction to Terbinafine medicine    Pain with bowel movements    Occasionally when have diarrhea    Presence of other cardiac implants and grafts    Occular lenses (cataracts removed)    Pulmonary embolism (HCC)    Rash    Allergic reaction to Terbinafine medicine    Skin blushing/flushing    With exertion    Sleep apnea    Mild    Sleep disturbance    Occasionally have trouble staying asleep    Thyroid disease    Grave’s disease in remission    Wears glasses    For reading; had Lasik and cataract surgery      Past Surgical History:   Procedure Laterality Date    Colonoscopy  Last in 2018    Have had 4    Hysterectomy      May 2006    Needle biopsy left  2010    dr hinojosa's office    Other surgical history  04/20/2022    Thyroid and Parathyroid Gland Removal    Thyroidectomy  April 20, 2023    [facility] - right lobe removed due to cancer; also had a parathyroid gland removed    Total abdom hysterectomy  May 24, 2006    Supracervical      Social History:    Social History     Socioeconomic History    Marital status: Single   Tobacco Use     Smoking status: Never    Smokeless tobacco: Never   Vaping Use    Vaping status: Never Used   Substance and Sexual Activity    Alcohol use: Not Currently    Drug use: Never         REVIEW OF SYSTEMS:   GENERAL HEALTH: feels well otherwise  SKIN: denies any unusual skin lesions or rashes  RESPIRATORY: denies shortness of breath with exertion  CARDIOVASCULAR: denies chest pain on exertion  GI: denies abdominal pain and denies heartburn  NEURO: denies headaches    EXAM:   /84   Pulse 71   Temp 98 °F (36.7 °C)   Resp 16   Ht 5' 2\" (1.575 m)   Wt 214 lb (97.1 kg)   SpO2 98%   BMI 39.14 kg/m²   GENERAL: well developed, well nourished,in no apparent distress  SKIN: no rashes,no suspicious lesions  HEENT: atraumatic, normocephalic,ears and throat are clear  NECK: supple,no adenopathy,no bruits  LUNGS: clear to auscultation  CARDIO: RRR without murmur  GI: good BS's,no masses, HSM or tenderness  EXTREMITIES: no cyanosis, clubbing or edema    ASSESSMENT AND PLAN:   Pt presents for a recheck of her hypertension. BP is well controlled, no significant medication side effects noted.  PLAN: will continue present medications, reviewed diet, exercise and weight control. The patient indicates understanding of these issues and agrees to the plan.  The patient is asked to return in 6 m.    History/Other:   Review of Systems  Current Outpatient Medications   Medication Sig Dispense Refill    amLODIPine 10 MG Oral Tab Take 1 tablet (10 mg total) by mouth daily. 90 tablet 1    omeprazole 20 MG Oral Capsule Delayed Release Take 1 capsule (20 mg total) by mouth before breakfast. 90 capsule 1    OASIS TEARS PF OP Place 1 drop into both eyes 3 (three) times daily as needed (dry eyes).      apixaban (ELIQUIS) 2.5 MG Oral Tab Take 1 tablet (2.5 mg total) by mouth 2 (two) times daily. Take 14 days only then stop 60 tablet 0    Blood Glucose Monitoring Suppl (ONETOUCH VERIO) w/Device Does not apply Kit 1 each daily. 1 kit 0     Glucose Blood (ONETOUCH VERIO) In Vitro Strip Test 2x daily 200 strip 0    OneTouch Delica Lancets 33G Does not apply Misc Test 2 x daily 200 each 0    levothyroxine 50 MCG Oral Tab Take 1 tablet (50 mcg total) by mouth before breakfast. 90 tablet 3     Allergies:Allergies[1]    Objective:   Physical Exam    Assessment & Plan:   1. Primary hypertension    2. Encounter for screening mammogram for malignant neoplasm of breast        No orders of the defined types were placed in this encounter.      Meds This Visit:  Requested Prescriptions     Signed Prescriptions Disp Refills    amLODIPine 10 MG Oral Tab 90 tablet 1     Sig: Take 1 tablet (10 mg total) by mouth daily.    omeprazole 20 MG Oral Capsule Delayed Release 90 capsule 1     Sig: Take 1 capsule (20 mg total) by mouth before breakfast.       Imaging & Referrals:  [facility] 2D+3D SCREENING BILAT (CPT=77067/27822)         [1]   Allergies  Allergen Reactions    Cat Hair Extract SWELLING     Causes throat itching, coughing and sneezing    Terbinafine HIVES, RASH, ITCHING and SWELLING    Erythromycin PAIN and NAUSEA AND VOMITING     Stomach pain    Amoxicillin-Pot Clavulanate RASH    Nitrofurantoin RASH

## 2024-12-17 ENCOUNTER — LAB ENCOUNTER (OUTPATIENT)
Dept: LAB | Age: 59
End: 2024-12-17
Attending: INTERNAL MEDICINE
Payer: COMMERCIAL

## 2024-12-17 ENCOUNTER — OFFICE VISIT (OUTPATIENT)
Dept: INTERNAL MEDICINE CLINIC | Facility: CLINIC | Age: 59
End: 2024-12-17
Payer: COMMERCIAL

## 2024-12-17 VITALS
DIASTOLIC BLOOD PRESSURE: 84 MMHG | HEIGHT: 62 IN | HEART RATE: 71 BPM | WEIGHT: 214 LBS | SYSTOLIC BLOOD PRESSURE: 126 MMHG | TEMPERATURE: 98 F | BODY MASS INDEX: 39.38 KG/M2 | OXYGEN SATURATION: 98 % | RESPIRATION RATE: 16 BRPM

## 2024-12-17 DIAGNOSIS — Z12.31 ENCOUNTER FOR SCREENING MAMMOGRAM FOR MALIGNANT NEOPLASM OF BREAST: ICD-10-CM

## 2024-12-17 DIAGNOSIS — I10 PRIMARY HYPERTENSION: Primary | ICD-10-CM

## 2024-12-17 DIAGNOSIS — Z00.00 ROUTINE GENERAL MEDICAL EXAMINATION AT A HEALTH CARE FACILITY: ICD-10-CM

## 2024-12-17 LAB
BILIRUB UR QL STRIP.AUTO: NEGATIVE
CLARITY UR REFRACT.AUTO: CLEAR
COLOR UR AUTO: COLORLESS
GLUCOSE UR STRIP.AUTO-MCNC: NORMAL MG/DL
KETONES UR STRIP.AUTO-MCNC: NEGATIVE MG/DL
LEUKOCYTE ESTERASE UR QL STRIP.AUTO: 25
NITRITE UR QL STRIP.AUTO: NEGATIVE
PH UR STRIP.AUTO: 6 [PH] (ref 5–8)
PROT UR STRIP.AUTO-MCNC: NEGATIVE MG/DL
RBC UR QL AUTO: NEGATIVE
SP GR UR STRIP.AUTO: <1.005 (ref 1–1.03)
UROBILINOGEN UR STRIP.AUTO-MCNC: NORMAL MG/DL

## 2024-12-17 PROCEDURE — 3074F SYST BP LT 130 MM HG: CPT | Performed by: INTERNAL MEDICINE

## 2024-12-17 PROCEDURE — 99213 OFFICE O/P EST LOW 20 MIN: CPT | Performed by: INTERNAL MEDICINE

## 2024-12-17 PROCEDURE — 3079F DIAST BP 80-89 MM HG: CPT | Performed by: INTERNAL MEDICINE

## 2024-12-17 PROCEDURE — 3008F BODY MASS INDEX DOCD: CPT | Performed by: INTERNAL MEDICINE

## 2024-12-17 PROCEDURE — 81001 URINALYSIS AUTO W/SCOPE: CPT | Performed by: INTERNAL MEDICINE

## 2024-12-17 RX ORDER — AMLODIPINE BESYLATE 10 MG/1
10 TABLET ORAL DAILY
Qty: 90 TABLET | Refills: 1 | Status: SHIPPED | OUTPATIENT
Start: 2024-12-17

## 2024-12-18 ENCOUNTER — OFFICE VISIT (OUTPATIENT)
Facility: LOCATION | Age: 59
End: 2024-12-18
Payer: COMMERCIAL

## 2024-12-18 VITALS — DIASTOLIC BLOOD PRESSURE: 88 MMHG | TEMPERATURE: 98 F | HEART RATE: 69 BPM | SYSTOLIC BLOOD PRESSURE: 128 MMHG

## 2024-12-18 DIAGNOSIS — Z98.890 POSTOPERATIVE STATE: Primary | ICD-10-CM

## 2024-12-18 DIAGNOSIS — Z90.49 STATUS POST LAPAROSCOPIC CHOLECYSTECTOMY: ICD-10-CM

## 2024-12-18 PROCEDURE — 99024 POSTOP FOLLOW-UP VISIT: CPT

## 2024-12-18 PROCEDURE — 3079F DIAST BP 80-89 MM HG: CPT

## 2024-12-18 PROCEDURE — 3074F SYST BP LT 130 MM HG: CPT

## 2024-12-18 NOTE — PROGRESS NOTES
Follow Up Visit Note       Active Problems      1. Postoperative state    2. Status post laparoscopic cholecystectomy          Chief Complaint   Chief Complaint   Patient presents with    Post-Op     PO - 12/4 Lap Yanira with Dr. Fernandez. Per patient her pain is very mild and she is currently on a low fat diet. Patient states her last bowel movement was on 12/16.          History of Present Illness    Usha Last is a 59-year-old female who presents to clinic for continued care and evaluation following laparoscopic cholecystectomy on 12//24 with Dr. Fernandez.    The patient reports doing well postoperatively.  She denies incisional discomfort.  She denies taking narcotic analgesics.  She is supplementing with Tylenol and ibuprofen as needed.    She reports 1 episode of emesis immediately after discharge.  She currently denies nausea or vomiting.  She denies fevers or chills.    She does report constipation and is interested in restarting her Citrucel.     She is following a low fat diet.    Usha continues to wash her incision sites with soap and water daily.  She denies concern for incision site infection.    Allergies  Usha is allergic to cat hair extract, terbinafine, erythromycin, amoxicillin-pot clavulanate, and nitrofurantoin.    Past Medical / Surgical / Social / Family History    The past medical and past surgical history have been reviewed by me today.    Past Medical History:    Abdominal hernia    Hiatal hernia    Atypical mole    Removed; not cancer    Belching    Mild    Blood in the stool    Last instance was summer 2022    Blurred vision    Diagnosed with dry eyes    Body piercing    Ears    Change in hair    Toenail fungal infection; also ridges from plaque psoriasis    Constipation    Use Citrucel daily to help    Easy bruising    Bruised with physical therapy massages    Eye disease    Vitreous degeneration; high myopia    Factor V Leiden (HCC)    GERD (gastroesophageal reflux disease)    Heart palpitations     Have had palpitations; wore monitor in 2019 but no issues found    Heartburn    Hemorrhoids    Internal and external    History of depression    Itch of skin    Allergic reaction to Terbinafine medicine    Pain with bowel movements    Occasionally when have diarrhea    Presence of other cardiac implants and grafts    Occular lenses (cataracts removed)    Pulmonary embolism (HCC)    Rash    Allergic reaction to Terbinafine medicine    Skin blushing/flushing    With exertion    Sleep apnea    Mild    Sleep disturbance    Occasionally have trouble staying asleep    Thyroid disease    Grave’s disease in remission    Wears glasses    For reading; had Lasik and cataract surgery     Past Surgical History:   Procedure Laterality Date    Colonoscopy  Last in 2018    Have had 4    Hysterectomy      May 2006    Needle biopsy left  2010    dr hinojosa's office    Other surgical history  2022    Thyroid and Parathyroid Gland Removal    Thyroidectomy  2023    [facility] - right lobe removed due to cancer; also had a parathyroid gland removed    Total abdom hysterectomy  May 24, 2006    Supracervical       The family history and social history have been reviewed by me today.    Family History   Problem Relation Age of Onset    Hypertension Mother             Heart Attack Mother             Colon Polyps Father     Stroke Maternal Grandfather             Breast Cancer Maternal Grandmother             Diabetes Paternal Grandfather             Colon Polyps Brother      Social History     Socioeconomic History    Marital status: Single   Tobacco Use    Smoking status: Never    Smokeless tobacco: Never   Vaping Use    Vaping status: Never Used   Substance and Sexual Activity    Alcohol use: Not Currently    Drug use: Never        Current Outpatient Medications:     amLODIPine 10 MG Oral Tab, Take 1 tablet (10 mg total) by mouth daily., Disp: 90 tablet, Rfl: 1    omeprazole 20 MG Oral  Capsule Delayed Release, Take 1 capsule (20 mg total) by mouth before breakfast., Disp: 90 capsule, Rfl: 1    OASIS TEARS PF OP, Place 1 drop into both eyes 3 (three) times daily as needed (dry eyes)., Disp: , Rfl:     apixaban (ELIQUIS) 2.5 MG Oral Tab, Take 1 tablet (2.5 mg total) by mouth 2 (two) times daily. Take 14 days only then stop, Disp: 60 tablet, Rfl: 0    Blood Glucose Monitoring Suppl (ONETOUCH VERIO) w/Device Does not apply Kit, 1 each daily., Disp: 1 kit, Rfl: 0    Glucose Blood (ONETOUCH VERIO) In Vitro Strip, Test 2x daily, Disp: 200 strip, Rfl: 0    OneTouch Delica Lancets 33G Does not apply Misc, Test 2 x daily, Disp: 200 each, Rfl: 0    levothyroxine 50 MCG Oral Tab, Take 1 tablet (50 mcg total) by mouth before breakfast., Disp: 90 tablet, Rfl: 3     Review of Systems  The Review of Systems has been reviewed by me during today.  Review of Systems   Constitutional:  Negative for chills, diaphoresis, fatigue, fever and unexpected weight change.   HENT:  Negative for hearing loss, nosebleeds, sore throat and trouble swallowing.    Respiratory:  Negative for apnea, cough, shortness of breath and wheezing.    Cardiovascular:  Negative for chest pain, palpitations and leg swelling.   Gastrointestinal:  Positive for constipation. Negative for abdominal distention, abdominal pain, anal bleeding, blood in stool, diarrhea, nausea and vomiting.   Genitourinary:  Negative for difficulty urinating, dysuria, frequency and urgency.   Musculoskeletal:  Negative for arthralgias and myalgias.   Skin:  Negative for color change and rash.   Neurological:  Negative for tremors, syncope and weakness.   Hematological:  Negative for adenopathy. Does not bruise/bleed easily.   Psychiatric/Behavioral:  Negative for behavioral problems and sleep disturbance.         Physical Findings   /88 (BP Location: Left arm, Patient Position: Sitting, Cuff Size: adult)   Pulse 69   Temp 97.6 °F (36.4 °C) (Temporal)   Physical  Exam  Constitutional:       Appearance: Normal appearance.   HENT:      Head: Normocephalic and atraumatic.   Eyes:      Extraocular Movements: Extraocular movements intact.      Pupils: Pupils are equal, round, and reactive to light.   Pulmonary:      Effort: Pulmonary effort is normal. No respiratory distress.   Abdominal:      General: Abdomen is flat. Bowel sounds are normal. There is no distension.      Palpations: Abdomen is soft. There is no mass.      Tenderness: There is no abdominal tenderness. There is no guarding or rebound.          Comments: Laparoscopic incision sites are dry, clean, and intact.  There is healing ecchymosis surrounding the superior right incision site.  No surrounding erythema or drainage.  No signs of infection.   Musculoskeletal:         General: Normal range of motion.      Cervical back: Normal range of motion.   Skin:     General: Skin is warm.      Coloration: Skin is not jaundiced or pale.      Findings: No erythema.   Neurological:      General: No focal deficit present.      Mental Status: She is alert and oriented to person, place, and time.       Pathology  Final Diagnosis:   Gallbladder, cholecystectomy:  -Mild chronic cholecystitis with cholelithiasis.      Assessment   1. Postoperative state    2. Status post laparoscopic cholecystectomy        Plan   Overall, the patient continues to do well postoperatively.  Continue p.o. Tylenol or Motrin as needed for pain control.  I advised the patient she may begin Citrucel to aid in bowel function.  I also recommend incorporating more fiber into her diet and staying hydrated.  She is to maintain a 20 pound lifting restriction until 6 weeks after surgery.  Continue to wash incision sites with soap and water daily.  She may apply warm compresses to the areas of ecchymosis as needed for comfort.  Surgical pathology reviewed during today's visit.  She is follow-up as needed.  All of the patient's question concerns were addressed.   She expressed understanding is in agreement this plan.     No orders of the defined types were placed in this encounter.      Imaging & Referrals   None    Follow Up  No follow-ups on file.    DIMITRIS Myers

## 2025-02-12 ENCOUNTER — OFFICE VISIT (OUTPATIENT)
Facility: CLINIC | Age: 60
End: 2025-02-12
Payer: COMMERCIAL

## 2025-02-12 VITALS
BODY MASS INDEX: 39.38 KG/M2 | SYSTOLIC BLOOD PRESSURE: 118 MMHG | HEART RATE: 80 BPM | WEIGHT: 214 LBS | RESPIRATION RATE: 18 BRPM | OXYGEN SATURATION: 99 % | DIASTOLIC BLOOD PRESSURE: 62 MMHG | HEIGHT: 62 IN

## 2025-02-12 DIAGNOSIS — Z85.850 HX OF PAPILLARY THYROID CARCINOMA: Primary | ICD-10-CM

## 2025-02-12 DIAGNOSIS — E89.0 POST-SURGICAL HYPOTHYROIDISM: ICD-10-CM

## 2025-02-12 PROCEDURE — 3078F DIAST BP <80 MM HG: CPT | Performed by: STUDENT IN AN ORGANIZED HEALTH CARE EDUCATION/TRAINING PROGRAM

## 2025-02-12 PROCEDURE — 3008F BODY MASS INDEX DOCD: CPT | Performed by: STUDENT IN AN ORGANIZED HEALTH CARE EDUCATION/TRAINING PROGRAM

## 2025-02-12 PROCEDURE — 3074F SYST BP LT 130 MM HG: CPT | Performed by: STUDENT IN AN ORGANIZED HEALTH CARE EDUCATION/TRAINING PROGRAM

## 2025-02-12 PROCEDURE — 99214 OFFICE O/P EST MOD 30 MIN: CPT | Performed by: STUDENT IN AN ORGANIZED HEALTH CARE EDUCATION/TRAINING PROGRAM

## 2025-02-12 RX ORDER — LEVOTHYROXINE SODIUM 50 UG/1
50 TABLET ORAL
Qty: 90 TABLET | Refills: 3 | Status: SHIPPED | OUTPATIENT
Start: 2025-02-12

## 2025-02-12 NOTE — PATIENT INSTRUCTIONS
Keep the same 50mcg of levothyroxine every morning.   In January 2026, we'll redo labs and your thyroid ultrasound.     General follow up information:  Please let us know if you require any refills at least 1 week prior to your medication running out. If you do run out of medication, please call our office ASAP to request refills (do not wait until your follow up).  Please call us if you experience any problems with insurance coverage of medication, lab work, or imaging.   Lab results and imaging will typically be reviewed at follow up appointments, or within 3-5 business days of ALL results being in if you do not have an appointment scheduled in the near future. Our office will contact you for any abnormal results requiring more urgent follow up or action.   The on-call pager is for urgent matters only. If you are a type 1 diabetic and run out of insulin after business hours 8AM-4PM, you may call the on-call pager for a refill to a 24 hour pharmacy. If you have adrenal insufficiency and run out of steroids, you may call the on-call pager for a refill to a 24 hour pharmacy. All other refill requests should be requested during business hours.    Return Visit   [] Dr. Echols in 1 year

## 2025-03-12 NOTE — PROGRESS NOTES
Endocrinology Clinic Note    Name: Usha Last    Date: 2/12/2024      HISTORY OF PRESENT ILLNESS   Usha Last is a 59 year old female with PMHx significant for Graves (in remission), multifocal PTC (s/p R lobectomy 4/2022), hx of L parathyroidectomy (4/2022), factor V leiden, hx of PE who presents for f/u    Initial HPI in Feb 2023  Transferring care from [facility].     Graves hx:  Diagnosed in 2019, s/p MMI, stopped in 9/2020 when TSH normalized  No STEW, had been seen at [facility]  1/2023 ([facility]) TSH 1.21, fT4 1.4    Thyroid CA hx:  Multifocal PTC s/p R thyroidectomy (with concomitant L parathyroidectomy) in 4/2022 at [place]  Path: multifocal, largest focus 0.9cm, classic variant   Stage: T1aN0  June 2022 - started on LT4, currently 50mcg  1/2023 ([facility]) TSH 1.21, fT4 1.4  Not done follow up thyroid US yet    Primary hyperPTH hx:  S/p L parathyroidectomy in 4/2022  Post-op needed Ca supplementation (2g), stopped since May 2022  1/2023 ([facility]) PTH 27, Ca 9.8  Eats a lot of dairy  Takes vit D 2000IU  Reports DXA 2020 was normal.    Interim hx:  July 2023 2/2023 - thyroid US shows s/p R thyroidectomy; no nodules otherwise  7/2023 - vit D, TFTs, PTH, Ca all remain normal range  Remains on LT 50mcg  Working on weight management with weight clinic and PCP; motivated to lose weight organically    Feb 2024 1/2024 - thyroid US showing R thyroidectomy, no nodules identified  1/2024 - Ca 9.1-9.5, GFR 64, vit D 47, PTH 29, fT4 1.1, TT3 82, TSH 1.7 -- LT4 50mcg  Recently seen nephrologist, CKD has improved to stage 2  Is going to see a health   Has b/l frozen shoulder, improving but not back at baseline    February 2025  Here for yearly follow-up, no new issues/complaints  Currently on levothyroxine 50 mcg daily  TSH December 2024 with PCP was 1.744    PAST MEDICAL HISTORY:   Past Medical History:    Abdominal hernia    Hiatal hernia    Atypical mole    Removed; not cancer     Belching    Mild    Blood in the stool    Last instance was summer 2022    Blurred vision    Diagnosed with dry eyes    Body piercing    Ears    Change in hair    Toenail fungal infection; also ridges from plaque psoriasis    Constipation    Use Citrucel daily to help    Easy bruising    Bruised with physical therapy massages    Eye disease    Vitreous degeneration; high myopia    Factor V Leiden (HCC)    GERD (gastroesophageal reflux disease)    Heart palpitations    Have had palpitations; wore monitor in 2019 but no issues found    Heartburn    Hemorrhoids    Internal and external    History of depression    Itch of skin    Allergic reaction to Terbinafine medicine    Pain with bowel movements    Occasionally when have diarrhea    Presence of other cardiac implants and grafts    Occular lenses (cataracts removed)    Pulmonary embolism (HCC)    Rash    Allergic reaction to Terbinafine medicine    Skin blushing/flushing    With exertion    Sleep apnea    Mild    Sleep disturbance    Occasionally have trouble staying asleep    Thyroid disease    Grave’s disease in remission    Wears glasses    For reading; had Lasik and cataract surgery       PAST SURGICAL HISTORY:   Past Surgical History:   Procedure Laterality Date    Colonoscopy  Last in 2018    Have had 4    Hysterectomy      May 2006    Needle biopsy left  2010    dr hinojosa's office    Other surgical history  04/20/2022    Thyroid and Parathyroid Gland Removal    Thyroidectomy  April 20, 2023    [facility] - right lobe removed due to cancer; also had a parathyroid gland removed    Total abdom hysterectomy  May 24, 2006    Supracervical       CURRENT MEDICATIONS:    Current Outpatient Medications   Medication Sig Dispense Refill    levothyroxine 50 MCG Oral Tab Take 1 tablet (50 mcg total) by mouth before breakfast. 90 tablet 3    amLODIPine 10 MG Oral Tab Take 1 tablet (10 mg total) by mouth daily. 90 tablet 1    omeprazole 20 MG Oral Capsule Delayed Release Take 1  capsule (20 mg total) by mouth before breakfast. 90 capsule 1    OASIS TEARS PF OP Place 1 drop into both eyes 3 (three) times daily as needed (dry eyes).      Blood Glucose Monitoring Suppl (ONETOUCH VERIO) w/Device Does not apply Kit 1 each daily. 1 kit 0    Glucose Blood (ONETOUCH VERIO) In Vitro Strip Test 2x daily 200 strip 0    OneTouch Delica Lancets 33G Does not apply Misc Test 2 x daily 200 each 0     Endocrine Medications            levothyroxine 50 MCG Oral Tab            ALLERGIES:  Allergies   Allergen Reactions    Cat Hair Extract SWELLING     Causes throat itching, coughing and sneezing    Terbinafine HIVES, RASH, ITCHING and SWELLING    Erythromycin PAIN and NAUSEA AND VOMITING     Stomach pain    Amoxicillin-Pot Clavulanate RASH    Nitrofurantoin RASH       SOCIAL HISTORY:    Social History     Socioeconomic History    Marital status: Single   Tobacco Use    Smoking status: Never    Smokeless tobacco: Never   Vaping Use    Vaping status: Never Used   Substance and Sexual Activity    Alcohol use: Not Currently    Drug use: Never       FAMILY HISTORY:   Family History   Problem Relation Age of Onset    Hypertension Mother             Heart Attack Mother             Colon Polyps Father     Stroke Maternal Grandfather             Breast Cancer Maternal Grandmother             Diabetes Paternal Grandfather             Colon Polyps Brother          REVIEW OF SYSTEMS:  Ten point review of systems has been performed and is otherwise negative and/or non-contributory, except as described above.      PHYSICAL EXAM:   Vitals:    25 1436   BP: 118/62   Pulse: 80   Resp: 18   SpO2: 99%   Weight: 214 lb (97.1 kg)   Height: 5' 2\" (1.575 m)       BMI: Body mass index is 39.14 kg/m².     CONSTITUTIONAL:  awake, alert, cooperative, no apparent distress, and appears stated age  PSYCH: normal affect  LUNGS: breathing comfortably  CARDIOVASCULAR:  regular rate       DATA:      Pertinent data reviewed      ASSESSMENT AND PLAN:    (Z85.850) History of thyroid cancer  (primary encounter diagnosis)  Plan:     Thyroid cancer:  Treatment history   Surgery: R lobectomy on 4/2022 by Dr. Chandra ([place] endocrine surgeon)    Path showed: multifocal PTC, classic variant, largest tumor 0.9cm; opted no completion thyroidectomy    Stage: T1N0   Remnant ablation none    Surveillance history   Thyroid US: 2/2023 unremarkable   Labs: 1/2023 TSH 1.21, fT4 1.4; TSH goal 0.5-2.0  1/2024 - fT4 1.1, TT3 82, TSH 1.7 -- LT4 50mcg  12/2024 -TSH 1.744 on levothyroxine 50 mcg daily    - next thyroid US ~Jan 2026, sooner if sx, ordered  - labs prior to next appointment     (E89.2) Status post subtotal parathyroidectomy (HCC)  Plan:   S/p L parathyroidectomy in April 2022; briefly needed post-op Ca  1/2023 and 7/2023 and 1/2024 with normal PTH and Ca  - no PTH surveillance, monitor BMP    (E05.00) Graves' disease in remission  Plan:   S/p MMI 1327-1711, then s/p lobectomy for PTC. Currently on LT4 50mcg  - 1 yr refill given  - TFTs q6 months (once with PCP, once with endo)    The above plan was discussed in detail with the patient who verbalized understanding and agreement.     Maty Echols DO  [facility]  2/12/25     Note to patient: The 21 Century Cures Act makes medical notes like these available to patients in the interest of transparency. However, be advised this is a medical document. It is intended as peer to peer communication. It is written in medical language and may contain abbreviations or verbiage that are unfamiliar. It may appear blunt or direct. Medical documents are intended to carry relevant information, facts as evident, and the clinical opinion of the practitioner.      In reviewing this note, please be advised that Dragon Voice Recognition software used to dictate the note may have made errors in recognizing some of the words or phrases.

## 2025-04-08 ENCOUNTER — HOSPITAL ENCOUNTER (OUTPATIENT)
Dept: MAMMOGRAPHY | Age: 60
Discharge: HOME | End: 2025-04-08
Attending: INTERNAL MEDICINE
Payer: COMMERCIAL

## 2025-04-08 DIAGNOSIS — Z12.31 ENCOUNTER FOR SCREENING MAMMOGRAM FOR MALIGNANT NEOPLASM OF BREAST: ICD-10-CM

## 2025-04-08 PROCEDURE — 77067 SCR MAMMO BI INCL CAD: CPT | Performed by: INTERNAL MEDICINE

## 2025-04-08 PROCEDURE — 77063 BREAST TOMOSYNTHESIS BI: CPT | Performed by: INTERNAL MEDICINE

## 2025-06-30 ENCOUNTER — OFFICE VISIT (OUTPATIENT)
Dept: INTERNAL MEDICINE CLINIC | Facility: CLINIC | Age: 60
End: 2025-06-30
Payer: COMMERCIAL

## 2025-06-30 VITALS
HEIGHT: 62 IN | SYSTOLIC BLOOD PRESSURE: 120 MMHG | DIASTOLIC BLOOD PRESSURE: 72 MMHG | TEMPERATURE: 98 F | OXYGEN SATURATION: 98 % | HEART RATE: 67 BPM | BODY MASS INDEX: 36.44 KG/M2 | WEIGHT: 198 LBS | RESPIRATION RATE: 16 BRPM

## 2025-06-30 DIAGNOSIS — Z00.00 ROUTINE GENERAL MEDICAL EXAMINATION AT A HEALTH CARE FACILITY: ICD-10-CM

## 2025-06-30 DIAGNOSIS — Z00.00 ANNUAL PHYSICAL EXAM: Primary | ICD-10-CM

## 2025-06-30 RX ORDER — METHYLCELLULOSE 500 MG/1
TABLET ORAL
COMMUNITY
Start: 2025-04-17

## 2025-06-30 NOTE — PROGRESS NOTES
Subjective:   Patient ID: Usha Last is a 60 year old female.    HPI  HPI:   Usha Last is a 60 year old female who presents for a complete physical exam. Symptoms: denies discharge, itching, burning or dysuria, is menopausal. Patient complains of nothing    Normal state of health  Denies cp or sob    PAST MEDICAL, SOCIAL, FAMILY HISTORIES REVIEWED WITH PT  .     Immunization History   Administered Date(s) Administered    Covid-19 Vaccine Moderna 100 mcg/0.5 ml 04/06/2021, 05/04/2021, 11/24/2021    FLULAVAL 6 months & older 0.5 ml Prefilled syringe (89692) 12/22/2022    FLUZONE 6 months and older PFS 0.5 ml (72958) 10/22/2019, 09/25/2020, 09/24/2021, 11/29/2023    Influenza 09/30/2017    TDAP 01/01/2012, 08/19/2022, 08/25/2022    Zoster Vaccine Recombinant Adjuvanted (Shingrix) 01/04/2022, 06/24/2022     Wt Readings from Last 6 Encounters:   06/30/25 198 lb (89.8 kg)   02/12/25 214 lb (97.1 kg)   12/17/24 214 lb (97.1 kg)   12/04/24 215 lb (97.5 kg)   07/12/24 231 lb (104.8 kg)   06/24/24 231 lb (104.8 kg)     Body mass index is 36.21 kg/m².     Lab Results   Component Value Date    GLU 86 12/16/2024     (H) 12/04/2024    GLU 96 01/29/2024     Lab Results   Component Value Date    CHOLEST 138 12/16/2024    CHOLEST 180 06/01/2023     Lab Results   Component Value Date    HDL 48 12/16/2024    HDL 66 (H) 06/01/2023     Lab Results   Component Value Date    LDL 78 12/16/2024     (H) 06/01/2023     Lab Results   Component Value Date    AST 22 12/16/2024    AST 21 12/04/2024    AST 19 06/01/2023     Lab Results   Component Value Date    ALT 13 12/16/2024    ALT 16 12/04/2024    ALT 22 06/01/2023       Current Medications[1]   Past Medical History[2]   Past Surgical History[3]   Family History[4]   Social History:   Short Social Hx on File[5]  Occ: yes. : yes. Children: yes.   Exercise: minimal.  Diet: watches minimally     REVIEW OF SYSTEMS:   GENERAL: feels well otherwise  A  comprehensive 10 point review of systems was completed.     Pertinent positives and negatives noted in the HPI.      EXAM:   /72   Pulse 67   Temp 97.9 °F (36.6 °C)   Resp 16   Ht 5' 2\" (1.575 m)   Wt 198 lb (89.8 kg)   SpO2 98%   BMI 36.21 kg/m²   Body mass index is 36.21 kg/m².   GENERAL: well developed, well nourished,in no apparent distress  SKIN: no rashes,no suspicious lesions  HEENT: atraumatic, normocephalic,ears and throat are clear  EYES:PERRLA, EOMI, conjunctiva are clear  NECK: supple,no adenopathy,no bruits  LUNGS: clear to auscultation  CARDIO: RRR without murmur  GI: good BS's,no masses, HSM or tenderness  : deferred  MUSCULOSKELETAL: back is not tender  EXTREMITIES: no cyanosis, clubbing or edema  NEURO: Oriented times three,motor and sensory are grossly intact    ASSESSMENT AND PLAN:   Usha Last is a 60 year old female who presents for a complete physical exam.  Pap and pelvic done by gyne  UTD With  mammogram     Health maintenance, will check fasting Lipids, CMP, and CBC.     UTD with screening colonoscopy.     Declines prevnar 20 at this time    Pt info handouts given for: exercise, low fat diet     Body mass index is 36.21 kg/m²., recommended low fat diet and aerobic exercise 30 minutes three times weekly.      The patient indicates understanding of these issues and agrees to the plan.  The patient is asked to return for CPX in 12 m.    History/Other:   Review of Systems  Current Medications[6]  Allergies:Allergies[7]    Objective:   Physical Exam    Assessment & Plan:   1. Annual physical exam    2. Routine general medical examination at a health care facility        Orders Placed This Encounter   Procedures    CBC With Differential With Platelet    Comp Metabolic Panel (14)    Lipid Panel    TSH W Reflex To Free T4    Urinalysis, Routine    Vitamin D [E]    Folic Acid Serum [E]       Meds This Visit:  Requested Prescriptions      No prescriptions requested or ordered  in this encounter       Imaging & Referrals:  None         [1]   Current Outpatient Medications   Medication Sig Dispense Refill    Methylcellulose, Laxative, (CITRUCEL) 500 MG Oral Tab       levothyroxine 50 MCG Oral Tab Take 1 tablet (50 mcg total) by mouth before breakfast. 90 tablet 3    amLODIPine 10 MG Oral Tab Take 1 tablet (10 mg total) by mouth daily. 90 tablet 1    omeprazole 20 MG Oral Capsule Delayed Release Take 1 capsule (20 mg total) by mouth before breakfast. 90 capsule 1    OASIS TEARS PF OP Place 1 drop into both eyes 3 (three) times daily as needed (dry eyes).      Blood Glucose Monitoring Suppl (ONETOUCH VERIO) w/Device Does not apply Kit 1 each daily. 1 kit 0    Glucose Blood (ONETOUCH VERIO) In Vitro Strip Test 2x daily 200 strip 0    OneTouch Delica Lancets 33G Does not apply Misc Test 2 x daily 200 each 0   [2]   Past Medical History:   Abdominal hernia    Hiatal hernia    Atypical mole    Removed; not cancer    Belching    Mild    Blood in the stool    Last instance was summer 2022    Blurred vision    Diagnosed with dry eyes    Body piercing    Ears    Change in hair    Toenail fungal infection; also ridges from plaque psoriasis    Constipation    Use Citrucel daily to help    Easy bruising    Bruised with physical therapy massages    Eye disease    Vitreous degeneration; high myopia    Factor V Leiden (HCC)    GERD (gastroesophageal reflux disease)    Heart palpitations    Have had palpitations; wore monitor in 2019 but no issues found    Heartburn    Hemorrhoids    Internal and external    History of depression    Itch of skin    Allergic reaction to Terbinafine medicine    Pain with bowel movements    Occasionally when have diarrhea    Presence of other cardiac implants and grafts    Occular lenses (cataracts removed)    Pulmonary embolism (HCC)    Rash    Allergic reaction to Terbinafine medicine    Skin blushing/flushing    With exertion    Sleep apnea    Mild    Sleep disturbance     Occasionally have trouble staying asleep    Thyroid disease    Grave’s disease in remission    Wears glasses    For reading; had Lasik and cataract surgery   [3]   Past Surgical History:  Procedure Laterality Date    Colonoscopy  Last in 2018    Have had 4    Hysterectomy  2006    partial hystero.    Needle biopsy left Left     dr hinojosa's office, benign.    Other surgical history  2022    Thyroid and Parathyroid Gland Removal    Thyroidectomy  2023    [facility] - right lobe removed due to cancer; also had a parathyroid gland removed    Total abdom hysterectomy  May 24, 2006    Supracervical   [4]   Family History  Problem Relation Age of Onset    Hypertension Mother             Heart Attack Mother             Colon Polyps Father     Colon Polyps Brother     Breast Cancer Maternal Grandmother 60        60's    Stroke Maternal Grandfather             Diabetes Paternal Grandfather            [5]   Social History  Socioeconomic History    Marital status: Single   Tobacco Use    Smoking status: Never    Smokeless tobacco: Never   Vaping Use    Vaping status: Never Used   Substance and Sexual Activity    Alcohol use: Not Currently    Drug use: Never     Social Drivers of Health     Food Insecurity: No Food Insecurity (2025)    NCSS - Food Insecurity     Worried About Running Out of Food in the Last Year: No     Ran Out of Food in the Last Year: No   Transportation Needs: No Transportation Needs (2025)    NCSS - Transportation     Lack of Transportation: No   Housing Stability: Not At Risk (2025)    NCSS - Housing/Utilities     Has Housing: Yes     Worried About Losing Housing: No     Unable to Get Utilities: No   [6]   Current Outpatient Medications   Medication Sig Dispense Refill    Methylcellulose, Laxative, (CITRUCEL) 500 MG Oral Tab       levothyroxine 50 MCG Oral Tab Take 1 tablet (50 mcg total) by mouth before breakfast. 90 tablet 3    amLODIPine 10  MG Oral Tab Take 1 tablet (10 mg total) by mouth daily. 90 tablet 1    omeprazole 20 MG Oral Capsule Delayed Release Take 1 capsule (20 mg total) by mouth before breakfast. 90 capsule 1    OASIS TEARS PF OP Place 1 drop into both eyes 3 (three) times daily as needed (dry eyes).      Blood Glucose Monitoring Suppl (ONETOUCH VERIO) w/Device Does not apply Kit 1 each daily. 1 kit 0    Glucose Blood (ONETOUCH VERIO) In Vitro Strip Test 2x daily 200 strip 0    OneTouch Delica Lancets 33G Does not apply Misc Test 2 x daily 200 each 0   [7]   Allergies  Allergen Reactions    Cat Hair Extract SWELLING     Causes throat itching, coughing and sneezing    Terbinafine HIVES, RASH, ITCHING and SWELLING    Erythromycin PAIN and NAUSEA AND VOMITING     Stomach pain    Amoxicillin-Pot Clavulanate RASH    Nitrofurantoin RASH

## 2025-08-26 ENCOUNTER — ORDER TRANSCRIPTION (OUTPATIENT)
Dept: ADMINISTRATIVE | Facility: HOSPITAL | Age: 60
End: 2025-08-26

## 2025-08-26 DIAGNOSIS — Z13.6 SCREENING FOR CARDIOVASCULAR CONDITION: Primary | ICD-10-CM

## 2025-08-28 ENCOUNTER — HOSPITAL ENCOUNTER (OUTPATIENT)
Dept: CT IMAGING | Facility: HOSPITAL | Age: 60
Discharge: HOME | End: 2025-08-28
Attending: INTERNAL MEDICINE

## 2025-08-28 DIAGNOSIS — Z13.6 SCREENING FOR CARDIOVASCULAR CONDITION: ICD-10-CM

## (undated) DEVICE — APPLICATOR PREP 26ML CHG 2% ISO ALC 70%

## (undated) DEVICE — SYRINGE MED 10ML LL TIP W/O SFTY DISP

## (undated) DEVICE — SLEEVE COMPR MD KNEE LEN SGL USE KENDALL SCD

## (undated) DEVICE — CLIP APPLIER WITH CLIP LOGIC TECHNOLOGY: Brand: ENDO CLIP III

## (undated) DEVICE — TROCAR: Brand: KII SHIELDED BLADED ACCESS SYSTEM

## (undated) DEVICE — TROCAR: Brand: KII® SLEEVE

## (undated) DEVICE — TRADITIONAL MARYLAND DISSECTOR TIP, DISPOSABLE: Brand: RENEW

## (undated) DEVICE — Device: Brand: SUTURE PASSOR PRO

## (undated) DEVICE — LAPCLINCH GRASPER TIP, DISPOSABLE: Brand: RENEW

## (undated) DEVICE — POUCH SPECIMEN WIRE 6X3 250ML

## (undated) DEVICE — DALE ABDOMINAL BINDER, 12" WIDE, STRETCHES TO FIT 30"-45", 1 PER BOX.: Brand: DALE ABDOMINAL BINDER

## (undated) DEVICE — CATHETER URET 5FR L70CM FLX OPN TIP NONPORTED

## (undated) DEVICE — COVER,LIGHT,CAMERA,HARD,1/PK,STRL: Brand: MEDLINE

## (undated) DEVICE — #11 STERILE BLADE: Brand: POLYMER COATED BLADES

## (undated) DEVICE — ENDOCUT SCISSOR TIP, DISPOSABLE: Brand: RENEW

## (undated) DEVICE — SYRINGE MED 30ML STD CLR PLAS LL TIP N CTRL

## (undated) DEVICE — VISUALIZATION SYSTEM: Brand: CLEARIFY

## (undated) DEVICE — NEPTUNE E-SEP SMOKE EVACUATION PENCIL, COATED, 70MM BLADE, PUSH BUTTON SWITCH: Brand: NEPTUNE E-SEP

## (undated) DEVICE — SOLUTION IRRIG 1000ML 0.9% NACL USP BTL

## (undated) DEVICE — GRABBER GRASPER TIP, DISPOSABLE: Brand: RENEW

## (undated) DEVICE — ENDOPATH ULTRA VERESS INSUFFLATION NEEDLES WITH LUER LOCK CONNECTORS: Brand: ENDOPATH

## (undated) DEVICE — SUT COAT VCRL+ 0 27IN UR-6 ABSRB VLT ANTIBACT

## (undated) DEVICE — UNDYED BRAIDED (POLYGLACTIN 910), SYNTHETIC ABSORBABLE SUTURE: Brand: COATED VICRYL

## (undated) DEVICE — GOLDVAC PUSH BUTTON ELECTROSURGICAL SMOKE EVACUATION HANDPIECE: Brand: GOLDVAC

## (undated) DEVICE — L-HOOK CAUTERY PROBE TIP, DISPOSABLE: Brand: RENEW

## (undated) DEVICE — 40580 - THE PINK PAD - ADVANCED TRENDELENBURG POSITIONING KIT: Brand: 40580 - THE PINK PAD - ADVANCED TRENDELENBURG POSITIONING KIT

## (undated) DEVICE — SUT COAT VCRL + 0 54IN ABSRB UD ANTIBACT

## (undated) DEVICE — BANDAGE ADH 1INX3IN NAT FAB N ADH PD CURAD

## (undated) DEVICE — MINI ENDOCUT SCISSOR TIP, DISPOSABLE: Brand: RENEW

## (undated) DEVICE — LAP CHOLE/APPY CDS-LF: Brand: MEDLINE INDUSTRIES, INC.

## (undated) DEVICE — SHEET,DRAPE,40X58,STERILE: Brand: MEDLINE

## (undated) DEVICE — GLOVE SUR 6.5 SENSICARE PI PIP CRM PWD F

## (undated) NOTE — LETTER
[address]  [address]  Authorization for Surgical Operation and Procedure     Date:___________                                                                                                         Time:__________  I hereby authorize Surgeon(s):  Latanya Fernandez MD, my physician and his/her assistants (if applicable), which may include medical students, residents, and/or fellows, to perform the following surgical operation/ procedure and administer such anesthesia as may be determined necessary by my physician:  Operation/Procedure name (s) Procedure(s):  LAPAROSCOPIC CHOLECYSTECTOMY WITH INDOCYANINE GREEN, POSSIBLE OPEN on Usha Last   2.   I recognize that during the surgical operation/procedure, unforeseen conditions may necessitate additional or different procedures than those listed above.  I, therefore, further authorize and request that the above-named surgeon, assistants, or designees perform such procedures as are, in their judgment, necessary and desirable.    3.   My surgeon/physician has discussed prior to my surgery the potential benefits, risks and side effects of this procedure; the likelihood of achieving goals; and potential problems that might occur during recuperation.  They also discussed reasonable alternatives to the procedure, including risks, benefits, and side effects related to the alternatives and risks related to not receiving this procedure.  I have had all my questions answered and I acknowledge that no guarantee has been made as to the result that may be obtained.    4.   Should the need arise during my operation/procedure, which includes change of level of care prior to discharge, I also consent to the administration of blood and/or blood products.  Further, I understand that despite careful testing and screening of blood or blood products by collecting agencies, I may still be subject to ill effects as a result of receiving a blood  transfusion and/or blood products.  The following are some, but not all, of the potential risks that can occur: fever and allergic reactions, hemolytic reactions, transmission of diseases such as Hepatitis, AIDS and Cytomegalovirus (CMV) and fluid overload.  In the event that I wish to have an autologous transfusion of my own blood, or a directed donor transfusion, I will discuss this with my physician.  Check only if Refusing Blood or Blood Products  I understand refusal of blood or blood products as deemed necessary by my physician may have serious consequences to my condition to include possible death. I hereby assume responsibility for my refusal and release the hospital, its personnel, and my physicians from any responsibility for the consequences of my refusal.          o  Refuse      5.   I authorize the use of any specimen, organs, tissues, body parts or foreign objects that may be removed from my body during the operation/procedure for diagnosis, research or teaching purposes and their subsequent disposal by hospital authorities.  I also authorize the release of specimen test results and/or written reports to my treating physician on the hospital medical staff or other referring or consulting physicians involved in my care, at the discretion of the Pathologist or my treating physician.    6.   I consent to the photographing or videotaping of the operations or procedures to be performed, including appropriate portions of my body for medical, scientific, or educational purposes, provided my identity is not revealed by the pictures or by descriptive texts accompanying them.  If the procedure has been photographed/videotaped, the surgeon will obtain the original picture, image, videotape or CD.  The hospital will not be responsible for storage, release or maintenance of the picture, image, tape or CD.    7.   I consent to the presence of a  or observers in the operating room as deemed  necessary by my physician or their designees.    8.   I recognize that in the event my procedure results in extended X-Ray/fluoroscopy time, I may develop a skin reaction.    9. If I have a Do Not Attempt Resuscitation (DNAR) order in place, that status will be suspended while in the operating room, procedural suite, and during the recovery period unless otherwise explicitly stated by me (or a person authorized to consent on my behalf). The surgeon or my attending physician will determine when the applicable recovery period ends for purposes of reinstating the DNAR order.  10. Patients having a sterilization procedure: I understand that if the procedure is successful the results will be permanent and it will therefore be impossible for me to inseminate, conceive, or bear children.  I also understand that the procedure is intended to result in sterility, although the result has not been guaranteed.   11. I acknowledge that my physician has explained sedation/analgesia administration to me including the risk and benefits I consent to the administration of sedation/analgesia as may be necessary or desirable in the judgment of my physician.    I CERTIFY THAT I HAVE READ AND FULLY UNDERSTAND THE ABOVE CONSENT TO OPERATION and/or OTHER PROCEDURE.    _________________________________________  __________________________________  Signature of Patient     Signature of Responsible Person         ___________________________________         Printed Name of Responsible Person           _________________________________                 Relationship to Patient  _________________________________________  ______________________________  Signature of Witness          Date  Time      Patient Name: Usha Piersonth Berhane     : 4/10/1965                 Printed: 2024     Medical Record #: [id number]                     Page 1 of 2                                    [address]   [id number]    Consent for Anesthesia    IUsha agree to be cared for by an anesthesiologist, who is specially trained to monitor me and give me medicine to put me to sleep or keep me comfortable during my procedure    I understand that my anesthesiologist is not an employee or agent of [facility] or [employer]. He or she works for [employer].    As the patient asking for anesthesia services, I agree to:  Allow the anesthesiologist (anesthesia doctor) to give me medicine and do additional procedures as necessary. Some examples are: Starting or using an “IV” to give me medicine, fluids or blood during my procedure, and having a breathing tube placed to help me breathe when I’m asleep (intubation). In the event that my heart stops working properly, I understand that my anesthesiologist will make every effort to sustain my life, unless otherwise directed by [facility] Do Not Resuscitate documents.  Tell my anesthesia doctor before my procedure:  If I am pregnant.  The last time that I ate or drank.  All of the medicines I take (including prescriptions, herbal supplements, and pills I can buy without a prescription (including street drugs/illegal medications). Failure to inform my anesthesiologist about these medicines may increase my risk of anesthetic complications.  If I am allergic to anything or have had a reaction to anesthesia before.  I understand how the anesthesia medicine will help me (benefits).  I understand that with any type of anesthesia medicine there are risks:  The most common risks are: nausea, vomiting, sore throat, muscle soreness, damage to my eyes, mouth, or teeth (from breathing tube placement).  Rare risks include: remembering what happened during my procedure, allergic reactions to medications, injury to my airway, heart, lungs, vision, nerves, or muscles and in extremely rare instances death.  My doctor has explained to me other choices  available to me for my care (alternatives).  Pregnant Patients (“epidural”):  I understand that the risks of having an epidural (medicine given into my back to help control pain during labor), include itching, low blood pressure, difficulty urinating, headache or slowing of the baby’s heart. Very rare risks include infection, bleeding, seizure, irregular heart rhythms and nerve injury.  Regional Anesthesia (“spinal”, “epidural”, & “nerve blocks”):  I understand that rare but potential complications include headache, bleeding, infection, seizure, irregular heart rhythms, and nerve injury.    I can change my mind about having anesthesia services at any time before I get the medicine.    _____________________________________________________________________________  Patient (or Representative) Signature/Relationship to Patient  Date   Time    _____________________________________________________________________________   Name (if used)    Language/Organization   Time    _____________________________________________________________________________  Anesthesiologist Signature     Date   Time  I have discussed the procedure and information above with the patient (or patient’s representative) and answered their questions. The patient or their representative has agreed to have anesthesia services.    _____________________________________________________________________________  Witness        Date   Time  I have verified that the signature is that of the patient or patient’s representative, and that it was signed before the procedure  Patient Name: Usha Last     : 4/10/1965                 Printed: 2024     Medical Record #: [id number]                     Page 2 of 2

## (undated) NOTE — LETTER
No referring provider defined for this encounter. 12/01/23        Patient: Anna Leone   YOB: 1965   Date of Visit: 12/1/2023       Dear  Dr. Cookie Guadarrama MD,      Thank you for referring Anna Leone to my practice. Please find my assessment and plan below. As you know she is a 68-year-old female with a history of prediabetes, hypertension, GERD, status post partial thyroidectomy for thyroid cancer status post pulmonary embolism who I now the pleasure of seeing for evaluation of chronic kidney disease stage III. Laboratory studies have been reviewed in [facility] and in care everywhere. When she was in [place] she had a creatinine of 1.11 and 1.12 in June 2022. More recently on June 1, 2023 she had a creatinine 1.07 and finally on July 26, 2023 she had a creatinine 1.11 with an estimated GFR of 58 cc/min and renal consultation has now been advised. Past medical history is significant for prediabetes. She is try to control this with diet and exercise. She has had hypertension since 2019. She is also status post partial thyroidectomy for thyroid cancer. In 2006 she had a hysterectomy and post operatively had a pulmonary embolism. Medications are as listed. Denies any significant use of nonsteroidals. Social history she is a non-smoker. Family history is notable for father who has some chronic kidney disease but further details are unclear but he is not on dialysis. Review of system the patient otherwise states she is doing well without any chest pain, shortness of breath, GI or urinary tract symptoms. On physical exam blood pressure was 115/75 with a pulse of 54 and she weighed 220 pounds. Her neck was supple without JVD. Lungs are clear. Heart revealed a regular rate and rhythm with an S4 but no gallops, murmurs or rubs. Abdomen was soft, flat, nontender without organomegaly, masses or bruits. Extremities revealed no clubbing, cyanosis or edema.     I therefore informed the patient that she does have chronic kidney disease stage III. This may be secondary to hypertension but other causes need to be excluded. For completion sake a repeat CBC, renal panel, urinalysis, urine for microalbumin, urine for Bence-Jason protein, sed rate, connective tissue profile and a renal ultrasound have been ordered. Further impressions and recommendations will be forthcoming after reviewing the above. There is also been some reports that omeprazole may contribute to chronic kidney disease. Unfortunately if she stops the medication she will get heartburn symptoms within a day or 2. Otherwise she knows to maintain adequate hydration. Avoid nonsteroidals. Thank you very much for allowing me to participate in the care of your patient. If you have any questions please feel free to call.       Sincerely,   Manan Zee MD   [facility], [address] [place] [address]  [address] [facility] [id number] [facility] [address]    Document electronically generated by:  Manan Zee MD

## (undated) NOTE — LETTER
No referring provider defined for this encounter.       01/30/24        Patient: Usha Last   YOB: 1965   Date of Visit: 1/30/2024       Dear  Dr. Natalya MD,      Thank you for referring Usha Last to my practice.  Please find my assessment and plan below.      As you know she is a 58-year-old female with a history of prediabetes, hypertension, GERD, status post partial thyroidectomy for thyroid cancer, status post pulmonary embolism who I now had the pleasure of seeing for follow-up of what now appears to be chronic kidney disease stage II.  The patient just underwent a recent renal evaluation.  Of note is that a sed rate, connective tissue profile, urinalysis, and random urine for Bence-Jason protein was nonrevealing.  A renal ultrasound likewise was unremarkable.  Finally repeat labs done on January 3, 2024 showed that her creatinine was better down to 1.05 with an estimated GFR of 62 cc/min.  She then had some laboratory studies done on January 29, 2024 for her endocrinologist and creatinine was 1.03 with an estimated GFR of 64 cc/min.    I therefore informed the patient that her renal function actually is a bit better.  Now with chronic kidney disease stage II.  Reinforced importance of maintaining adequate hydration.  Avoid nonsteroidals.  Keep blood pressures under good control.  Will repeat a CBC and renal panel in 3 months to ensure stability.    Thank you again for allowing me to participate in the care of your patient.  If you have any questions please feel free to call.           Sincerely,   Trace Garsia MD   [facility], [facility], [place]  [address]  [facility] [address]    Document electronically generated by:  Trace Garsia MD